# Patient Record
Sex: MALE | Race: WHITE | URBAN - METROPOLITAN AREA
[De-identification: names, ages, dates, MRNs, and addresses within clinical notes are randomized per-mention and may not be internally consistent; named-entity substitution may affect disease eponyms.]

---

## 2018-06-14 ENCOUNTER — EMERGENCY (EMERGENCY)
Facility: HOSPITAL | Age: 78
LOS: 0 days | Discharge: NURSING, REHAB OR LONG-TERM CARE | End: 2018-06-14
Attending: EMERGENCY MEDICINE | Admitting: EMERGENCY MEDICINE
Payer: MEDICARE

## 2018-06-14 VITALS
TEMPERATURE: 98 F | HEART RATE: 88 BPM | RESPIRATION RATE: 16 BRPM | SYSTOLIC BLOOD PRESSURE: 148 MMHG | OXYGEN SATURATION: 96 % | WEIGHT: 153 LBS | DIASTOLIC BLOOD PRESSURE: 96 MMHG

## 2018-06-14 DIAGNOSIS — S01.01XA LACERATION WITHOUT FOREIGN BODY OF SCALP, INITIAL ENCOUNTER: ICD-10-CM

## 2018-06-14 DIAGNOSIS — F03.90 UNSPECIFIED DEMENTIA WITHOUT BEHAVIORAL DISTURBANCE: ICD-10-CM

## 2018-06-14 DIAGNOSIS — M50.30 OTHER CERVICAL DISC DEGENERATION, UNSPECIFIED CERVICAL REGION: ICD-10-CM

## 2018-06-14 DIAGNOSIS — I10 ESSENTIAL (PRIMARY) HYPERTENSION: ICD-10-CM
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
## 2018-06-14 DIAGNOSIS — Z23 ENCOUNTER FOR IMMUNIZATION: ICD-10-CM

## 2018-06-14 DIAGNOSIS — M47.9 SPONDYLOSIS, UNSPECIFIED: ICD-10-CM
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
## 2018-06-14 DIAGNOSIS — E78.5 HYPERLIPIDEMIA, UNSPECIFIED: ICD-10-CM

## 2018-06-14 DIAGNOSIS — I67.89 OTHER CEREBROVASCULAR DISEASE: ICD-10-CM

## 2018-06-14 LAB
ALBUMIN SERPL ELPH-MCNC: 3.6 G/DL — SIGNIFICANT CHANGE UP (ref 3.3–5)
ALP SERPL-CCNC: 50 U/L — SIGNIFICANT CHANGE UP (ref 40–120)
ALT FLD-CCNC: 21 U/L — SIGNIFICANT CHANGE UP (ref 12–78)
ANION GAP SERPL CALC-SCNC: 5 MMOL/L — SIGNIFICANT CHANGE UP (ref 5–17)
APPEARANCE UR: CLEAR — SIGNIFICANT CHANGE UP
AST SERPL-CCNC: 19 U/L — SIGNIFICANT CHANGE UP (ref 15–37)
BASOPHILS # BLD AUTO: 0.07 K/UL — SIGNIFICANT CHANGE UP (ref 0–0.2)
BASOPHILS NFR BLD AUTO: 0.8 % — SIGNIFICANT CHANGE UP (ref 0–2)
BILIRUB SERPL-MCNC: 0.4 MG/DL — SIGNIFICANT CHANGE UP (ref 0.2–1.2)
BILIRUB UR-MCNC: NEGATIVE — SIGNIFICANT CHANGE UP
BUN SERPL-MCNC: 20 MG/DL — SIGNIFICANT CHANGE UP (ref 7–23)
CALCIUM SERPL-MCNC: 9.1 MG/DL — SIGNIFICANT CHANGE UP (ref 8.5–10.1)
CHLORIDE SERPL-SCNC: 107 MMOL/L — SIGNIFICANT CHANGE UP (ref 96–108)
CO2 SERPL-SCNC: 30 MMOL/L — SIGNIFICANT CHANGE UP (ref 22–31)
COLOR SPEC: YELLOW — SIGNIFICANT CHANGE UP
CREAT SERPL-MCNC: 0.93 MG/DL — SIGNIFICANT CHANGE UP (ref 0.5–1.3)
DIFF PNL FLD: NEGATIVE — SIGNIFICANT CHANGE UP
EOSINOPHIL # BLD AUTO: 0.55 K/UL — HIGH (ref 0–0.5)
EOSINOPHIL NFR BLD AUTO: 6.1 % — HIGH (ref 0–6)
GLUCOSE SERPL-MCNC: 91 MG/DL — SIGNIFICANT CHANGE UP (ref 70–99)
GLUCOSE UR QL: NEGATIVE MG/DL — SIGNIFICANT CHANGE UP
HCT VFR BLD CALC: 40 % — SIGNIFICANT CHANGE UP (ref 39–50)
HGB BLD-MCNC: 13.1 G/DL — SIGNIFICANT CHANGE UP (ref 13–17)
IMM GRANULOCYTES NFR BLD AUTO: 0.3 % — SIGNIFICANT CHANGE UP (ref 0–1.5)
KETONES UR-MCNC: NEGATIVE — SIGNIFICANT CHANGE UP
LEUKOCYTE ESTERASE UR-ACNC: NEGATIVE — SIGNIFICANT CHANGE UP
LYMPHOCYTES # BLD AUTO: 1.55 K/UL — SIGNIFICANT CHANGE UP (ref 1–3.3)
LYMPHOCYTES # BLD AUTO: 17.3 % — SIGNIFICANT CHANGE UP (ref 13–44)
MCHC RBC-ENTMCNC: 31.3 PG — SIGNIFICANT CHANGE UP (ref 27–34)
MCHC RBC-ENTMCNC: 32.8 GM/DL — SIGNIFICANT CHANGE UP (ref 32–36)
MCV RBC AUTO: 95.7 FL — SIGNIFICANT CHANGE UP (ref 80–100)
MONOCYTES # BLD AUTO: 1.02 K/UL — HIGH (ref 0–0.9)
MONOCYTES NFR BLD AUTO: 11.4 % — SIGNIFICANT CHANGE UP (ref 2–14)
NEUTROPHILS # BLD AUTO: 5.73 K/UL — SIGNIFICANT CHANGE UP (ref 1.8–7.4)
NEUTROPHILS NFR BLD AUTO: 64.1 % — SIGNIFICANT CHANGE UP (ref 43–77)
NITRITE UR-MCNC: NEGATIVE — SIGNIFICANT CHANGE UP
PH UR: 5 — SIGNIFICANT CHANGE UP (ref 5–8)
PLATELET # BLD AUTO: 214 K/UL — SIGNIFICANT CHANGE UP (ref 150–400)
POTASSIUM SERPL-MCNC: 4.3 MMOL/L — SIGNIFICANT CHANGE UP (ref 3.5–5.3)
POTASSIUM SERPL-SCNC: 4.3 MMOL/L — SIGNIFICANT CHANGE UP (ref 3.5–5.3)
PROT SERPL-MCNC: 7.6 GM/DL — SIGNIFICANT CHANGE UP (ref 6–8.3)
PROT UR-MCNC: NEGATIVE MG/DL — SIGNIFICANT CHANGE UP
RBC # BLD: 4.18 M/UL — LOW (ref 4.2–5.8)
RBC # FLD: 13.4 % — SIGNIFICANT CHANGE UP (ref 10.3–14.5)
SODIUM SERPL-SCNC: 142 MMOL/L — SIGNIFICANT CHANGE UP (ref 135–145)
SP GR SPEC: 1.01 — SIGNIFICANT CHANGE UP (ref 1.01–1.02)
TROPONIN I SERPL-MCNC: <0.015 NG/ML — SIGNIFICANT CHANGE UP (ref 0.01–0.04)
TROPONIN I SERPL-MCNC: <0.015 NG/ML — SIGNIFICANT CHANGE UP (ref 0.01–0.04)
UROBILINOGEN FLD QL: NEGATIVE MG/DL — SIGNIFICANT CHANGE UP
WBC # BLD: 8.95 K/UL — SIGNIFICANT CHANGE UP (ref 3.8–10.5)
WBC # FLD AUTO: 8.95 K/UL — SIGNIFICANT CHANGE UP (ref 3.8–10.5)

## 2018-06-14 PROCEDURE — 12001 RPR S/N/AX/GEN/TRNK 2.5CM/<: CPT

## 2018-06-14 PROCEDURE — 93010 ELECTROCARDIOGRAM REPORT: CPT

## 2018-06-14 PROCEDURE — 72190 X-RAY EXAM OF PELVIS: CPT | Mod: 26

## 2018-06-14 PROCEDURE — 71045 X-RAY EXAM CHEST 1 VIEW: CPT | Mod: 26

## 2018-06-14 PROCEDURE — 72125 CT NECK SPINE W/O DYE: CPT | Mod: 26

## 2018-06-14 PROCEDURE — 70450 CT HEAD/BRAIN W/O DYE: CPT | Mod: 26

## 2018-06-14 PROCEDURE — 99285 EMERGENCY DEPT VISIT HI MDM: CPT

## 2018-06-14 RX ORDER — TETANUS TOXOID, REDUCED DIPHTHERIA TOXOID AND ACELLULAR PERTUSSIS VACCINE, ADSORBED 5; 2.5; 8; 8; 2.5 [IU]/.5ML; [IU]/.5ML; UG/.5ML; UG/.5ML; UG/.5ML
0.5 SUSPENSION INTRAMUSCULAR ONCE
Qty: 0 | Refills: 0 | Status: COMPLETED | OUTPATIENT
Start: 2018-06-14 | End: 2018-06-14

## 2018-06-14 RX ADMIN — TETANUS TOXOID, REDUCED DIPHTHERIA TOXOID AND ACELLULAR PERTUSSIS VACCINE, ADSORBED 0.5 MILLILITER(S): 5; 2.5; 8; 8; 2.5 SUSPENSION INTRAMUSCULAR at 15:45

## 2018-06-14 NOTE — ED ADULT NURSE NOTE - OBJECTIVE STATEMENT
Pt resident of [facility]. BIBA with report of unwitnessed fall. Pt reports that he lost his balance and that this happens. Pt [place] and sometimes has difficulty communicating, but is able to answer questions appropriately and make needs known. Pt to ct scan with trauma alert protocol maintained.

## 2018-06-14 NOTE — ED PROVIDER NOTE - OBJECTIVE STATEMENT
77 y/o male with a PMHx of HTN, dyslipidemia, dementia presents to the ED s/p unwitnessed fall. +laceration. Pt states he is unsure of what happened. He suspects he lost his balance. Unsure if LOC. Denies CP, SOB, fever, chills, n/v/d. No other acute complaints at this time. Pt is a nursing home resident.

## 2018-06-14 NOTE — ED PROVIDER NOTE - MEDICAL DECISION MAKING DETAILS
79 y/o M trauma alert presents s/p fall. Unclear radiology of fall since unwitnessed. Plan for labs Cardiac Enzymes x2, EKG, imaging, reassess.

## 2018-06-14 NOTE — ED ADULT NURSE NOTE - CHIEF COMPLAINT QUOTE
Unwitnessed fall at [facility].  Occipital laceration, unknown LOC.  Cervical collar placed by EMS.  GCS 15 on arrival, pt [place].  Daily asa.  Trauma alert called at 13:21

## 2018-06-14 NOTE — ED PROVIDER NOTE - UNABLE TO OBTAIN
Unable to obtain fully reliable Hx due to dementia. Hx obtained from a combination of pt and nursing home documentation. Dementia

## 2018-06-14 NOTE — ED PROVIDER NOTE - PROGRESS NOTE DETAILS
pts laceration on scalp closed  by CAROL Peralta, chaperoned by me.  2nd troponin negative, will dc back to NH. pt agreeable with plan. ccollar cleared

## 2018-06-14 NOTE — ED PROVIDER NOTE - CONSTITUTIONAL, MLM
normal... Well appearing, well nourished, awake, alert, oriented to person, place, time/situation and in no apparent distress. GCS 15.

## 2018-06-14 NOTE — ED PROVIDER NOTE - MUSCULOSKELETAL, MLM
Spine appears normal, range of motion is not limited, no muscle or joint tenderness. C collar in place.

## 2018-06-14 NOTE — ED PROVIDER NOTE - NS_ ATTENDINGSCRIBEDETAILS _ED_A_ED_FT
Hood Evans DO (Attending): The history, relevant review of systems, past medical and surgical history, medical decision making, and physical examination was documented by the scribe in my presence and I attest to the accuracy of the documentation.

## 2018-06-15 LAB
CULTURE RESULTS: NO GROWTH — SIGNIFICANT CHANGE UP
SPECIMEN SOURCE: SIGNIFICANT CHANGE UP

## 2020-04-13 RX ORDER — AZITHROMYCIN 500 MG/1
1 TABLET, FILM COATED ORAL
Qty: 0 | Refills: 0 | DISCHARGE
Start: 2020-04-13 | End: 2020-04-19

## 2020-04-16 ENCOUNTER — INPATIENT (INPATIENT)
Facility: HOSPITAL | Age: 80
LOS: 2 days | DRG: 951 | End: 2020-04-19
Attending: FAMILY MEDICINE | Admitting: INTERNAL MEDICINE
Payer: MEDICARE

## 2020-04-16 VITALS
RESPIRATION RATE: 28 BRPM | HEIGHT: 68 IN | WEIGHT: 175.05 LBS | SYSTOLIC BLOOD PRESSURE: 132 MMHG | OXYGEN SATURATION: 80 % | HEART RATE: 85 BPM | DIASTOLIC BLOOD PRESSURE: 98 MMHG

## 2020-04-16 DIAGNOSIS — E78.5 HYPERLIPIDEMIA, UNSPECIFIED: ICD-10-CM

## 2020-04-16 DIAGNOSIS — N40.0 BENIGN PROSTATIC HYPERPLASIA WITHOUT LOWER URINARY TRACT SYMPTOMS: ICD-10-CM

## 2020-04-16 DIAGNOSIS — I11.0 HYPERTENSIVE HEART DISEASE WITH HEART FAILURE: ICD-10-CM

## 2020-04-16 DIAGNOSIS — U07.1 COVID-19: ICD-10-CM

## 2020-04-16 DIAGNOSIS — E86.0 DEHYDRATION: ICD-10-CM

## 2020-04-16 DIAGNOSIS — Z66 DO NOT RESUSCITATE: ICD-10-CM

## 2020-04-16 DIAGNOSIS — R13.10 DYSPHAGIA, UNSPECIFIED: ICD-10-CM

## 2020-04-16 DIAGNOSIS — J96.91 RESPIRATORY FAILURE, UNSPECIFIED WITH HYPOXIA: ICD-10-CM

## 2020-04-16 DIAGNOSIS — I47.1 SUPRAVENTRICULAR TACHYCARDIA: ICD-10-CM

## 2020-04-16 DIAGNOSIS — A41.50 GRAM-NEGATIVE SEPSIS, UNSPECIFIED: ICD-10-CM

## 2020-04-16 DIAGNOSIS — F03.90 UNSPECIFIED DEMENTIA WITHOUT BEHAVIORAL DISTURBANCE: ICD-10-CM

## 2020-04-16 DIAGNOSIS — F01.50 VASCULAR DEMENTIA WITHOUT BEHAVIORAL DISTURBANCE: ICD-10-CM

## 2020-04-16 DIAGNOSIS — E87.0 HYPEROSMOLALITY AND HYPERNATREMIA: ICD-10-CM

## 2020-04-16 DIAGNOSIS — J12.89 OTHER VIRAL PNEUMONIA: ICD-10-CM

## 2020-04-16 DIAGNOSIS — I82.452 ACUTE EMBOLISM AND THROMBOSIS OF LEFT PERONEAL VEIN: ICD-10-CM

## 2020-04-16 DIAGNOSIS — Z51.5 ENCOUNTER FOR PALLIATIVE CARE: ICD-10-CM

## 2020-04-16 DIAGNOSIS — N17.9 ACUTE KIDNEY FAILURE, UNSPECIFIED: ICD-10-CM

## 2020-04-16 DIAGNOSIS — I50.9 HEART FAILURE, UNSPECIFIED: ICD-10-CM

## 2020-04-16 DIAGNOSIS — B96.89 OTHER SPECIFIED BACTERIAL AGENTS AS THE CAUSE OF DISEASES CLASSIFIED ELSEWHERE: ICD-10-CM

## 2020-04-16 PROBLEM — I10 ESSENTIAL (PRIMARY) HYPERTENSION: Chronic | Status: ACTIVE | Noted: 2018-06-14

## 2020-04-16 LAB
-  CANDIDA ALBICANS: SIGNIFICANT CHANGE UP
-  CANDIDA GLABRATA: SIGNIFICANT CHANGE UP
-  CANDIDA KRUSEI: SIGNIFICANT CHANGE UP
-  CANDIDA PARAPSILOSIS: SIGNIFICANT CHANGE UP
-  CANDIDA TROPICALIS: SIGNIFICANT CHANGE UP
-  COAGULASE NEGATIVE STAPHYLOCOCCUS: SIGNIFICANT CHANGE UP
-  K. PNEUMONIAE GROUP: SIGNIFICANT CHANGE UP
-  KPC RESISTANCE GENE: SIGNIFICANT CHANGE UP
-  STREPTOCOCCUS SP. (NOT GRP A, B OR S PNEUMONIAE): SIGNIFICANT CHANGE UP
A BAUMANNII DNA SPEC QL NAA+PROBE: SIGNIFICANT CHANGE UP
ADD ON TEST-SPECIMEN IN LAB: SIGNIFICANT CHANGE UP
ALBUMIN SERPL ELPH-MCNC: 2.4 G/DL — LOW (ref 3.3–5)
ALP SERPL-CCNC: 173 U/L — HIGH (ref 40–120)
ALT FLD-CCNC: 26 U/L — SIGNIFICANT CHANGE UP (ref 12–78)
ANION GAP SERPL CALC-SCNC: 9 MMOL/L — SIGNIFICANT CHANGE UP (ref 5–17)
AST SERPL-CCNC: 22 U/L — SIGNIFICANT CHANGE UP (ref 15–37)
BASOPHILS # BLD AUTO: 0 K/UL — SIGNIFICANT CHANGE UP (ref 0–0.2)
BASOPHILS NFR BLD AUTO: 0 % — SIGNIFICANT CHANGE UP (ref 0–2)
BILIRUB SERPL-MCNC: 0.8 MG/DL — SIGNIFICANT CHANGE UP (ref 0.2–1.2)
BUN SERPL-MCNC: 44 MG/DL — HIGH (ref 7–23)
CALCIUM SERPL-MCNC: 8.9 MG/DL — SIGNIFICANT CHANGE UP (ref 8.5–10.1)
CHLORIDE SERPL-SCNC: 115 MMOL/L — HIGH (ref 96–108)
CK SERPL-CCNC: 32 U/L — SIGNIFICANT CHANGE UP (ref 26–308)
CO2 SERPL-SCNC: 20 MMOL/L — LOW (ref 22–31)
CREAT SERPL-MCNC: 1.67 MG/DL — HIGH (ref 0.5–1.3)
E CLOAC COMP DNA BLD POS QL NAA+PROBE: SIGNIFICANT CHANGE UP
E COLI DNA BLD POS QL NAA+NON-PROBE: SIGNIFICANT CHANGE UP
ENTEROCOC DNA BLD POS QL NAA+NON-PROBE: SIGNIFICANT CHANGE UP
ENTEROCOC DNA BLD POS QL NAA+NON-PROBE: SIGNIFICANT CHANGE UP
EOSINOPHIL # BLD AUTO: 0 K/UL — SIGNIFICANT CHANGE UP (ref 0–0.5)
EOSINOPHIL NFR BLD AUTO: 0 % — SIGNIFICANT CHANGE UP (ref 0–6)
GLUCOSE SERPL-MCNC: 151 MG/DL — HIGH (ref 70–99)
GP B STREP DNA BLD POS QL NAA+NON-PROBE: SIGNIFICANT CHANGE UP
GRAM STN FLD: SIGNIFICANT CHANGE UP
GRAM STN FLD: SIGNIFICANT CHANGE UP
HAEM INFLU DNA BLD POS QL NAA+NON-PROBE: SIGNIFICANT CHANGE UP
HCT VFR BLD CALC: 42.8 % — SIGNIFICANT CHANGE UP (ref 39–50)
HGB BLD-MCNC: 13.7 G/DL — SIGNIFICANT CHANGE UP (ref 13–17)
K OXYTOCA DNA BLD POS QL NAA+NON-PROBE: SIGNIFICANT CHANGE UP
L MONOCYTOG DNA BLD POS QL NAA+NON-PROBE: SIGNIFICANT CHANGE UP
LYMPHOCYTES # BLD AUTO: 0.45 K/UL — LOW (ref 1–3.3)
LYMPHOCYTES # BLD AUTO: 9 % — LOW (ref 13–44)
MCHC RBC-ENTMCNC: 31.4 PG — SIGNIFICANT CHANGE UP (ref 27–34)
MCHC RBC-ENTMCNC: 32 GM/DL — SIGNIFICANT CHANGE UP (ref 32–36)
MCV RBC AUTO: 98.2 FL — SIGNIFICANT CHANGE UP (ref 80–100)
METHOD TYPE: SIGNIFICANT CHANGE UP
MONOCYTES # BLD AUTO: 0.1 K/UL — SIGNIFICANT CHANGE UP (ref 0–0.9)
MONOCYTES NFR BLD AUTO: 2 % — SIGNIFICANT CHANGE UP (ref 2–14)
MRSA SPEC QL CULT: SIGNIFICANT CHANGE UP
MSSA DNA SPEC QL NAA+PROBE: SIGNIFICANT CHANGE UP
N MEN ISLT CULT: SIGNIFICANT CHANGE UP
NEUTROPHILS # BLD AUTO: 4.49 K/UL — SIGNIFICANT CHANGE UP (ref 1.8–7.4)
NEUTROPHILS NFR BLD AUTO: 89 % — HIGH (ref 43–77)
NRBC # BLD: SIGNIFICANT CHANGE UP /100 WBCS (ref 0–0)
NT-PROBNP SERPL-SCNC: 2079 PG/ML — HIGH (ref 0–450)
P AERUGINOSA DNA BLD POS NAA+NON-PROBE: SIGNIFICANT CHANGE UP
PLATELET # BLD AUTO: 67 K/UL — LOW (ref 150–400)
POTASSIUM SERPL-MCNC: 4.3 MMOL/L — SIGNIFICANT CHANGE UP (ref 3.5–5.3)
POTASSIUM SERPL-SCNC: 4.3 MMOL/L — SIGNIFICANT CHANGE UP (ref 3.5–5.3)
PROT SERPL-MCNC: 6.9 GM/DL — SIGNIFICANT CHANGE UP (ref 6–8.3)
RBC # BLD: 4.36 M/UL — SIGNIFICANT CHANGE UP (ref 4.2–5.8)
RBC # FLD: 14.6 % — HIGH (ref 10.3–14.5)
S MARCESCENS DNA BLD POS NAA+NON-PROBE: SIGNIFICANT CHANGE UP
S PNEUM DNA BLD POS QL NAA+NON-PROBE: SIGNIFICANT CHANGE UP
S PYO DNA BLD POS QL NAA+NON-PROBE: SIGNIFICANT CHANGE UP
SODIUM SERPL-SCNC: 144 MMOL/L — SIGNIFICANT CHANGE UP (ref 135–145)
SPECIMEN SOURCE: SIGNIFICANT CHANGE UP
TROPONIN I SERPL-MCNC: 0.02 NG/ML — SIGNIFICANT CHANGE UP (ref 0.01–0.04)
WBC # BLD: 5.04 K/UL — SIGNIFICANT CHANGE UP (ref 3.8–10.5)
WBC # FLD AUTO: 5.04 K/UL — SIGNIFICANT CHANGE UP (ref 3.8–10.5)

## 2020-04-16 PROCEDURE — 81001 URINALYSIS AUTO W/SCOPE: CPT

## 2020-04-16 PROCEDURE — 71045 X-RAY EXAM CHEST 1 VIEW: CPT | Mod: 26

## 2020-04-16 PROCEDURE — 85730 THROMBOPLASTIN TIME PARTIAL: CPT

## 2020-04-16 PROCEDURE — 36415 COLL VENOUS BLD VENIPUNCTURE: CPT

## 2020-04-16 PROCEDURE — 93970 EXTREMITY STUDY: CPT

## 2020-04-16 PROCEDURE — 92507 TX SP LANG VOICE COMM INDIV: CPT | Mod: GN

## 2020-04-16 PROCEDURE — 76700 US EXAM ABDOM COMPLETE: CPT

## 2020-04-16 PROCEDURE — 85025 COMPLETE CBC W/AUTO DIFF WBC: CPT

## 2020-04-16 PROCEDURE — 87040 BLOOD CULTURE FOR BACTERIA: CPT

## 2020-04-16 PROCEDURE — 80053 COMPREHEN METABOLIC PANEL: CPT

## 2020-04-16 PROCEDURE — 82728 ASSAY OF FERRITIN: CPT

## 2020-04-16 PROCEDURE — 93010 ELECTROCARDIOGRAM REPORT: CPT

## 2020-04-16 PROCEDURE — 92523 SPEECH SOUND LANG COMPREHEN: CPT | Mod: GN

## 2020-04-16 PROCEDURE — 85379 FIBRIN DEGRADATION QUANT: CPT

## 2020-04-16 PROCEDURE — 99223 1ST HOSP IP/OBS HIGH 75: CPT | Mod: CS

## 2020-04-16 PROCEDURE — 92526 ORAL FUNCTION THERAPY: CPT | Mod: GN

## 2020-04-16 PROCEDURE — 92610 EVALUATE SWALLOWING FUNCTION: CPT | Mod: GN

## 2020-04-16 PROCEDURE — 86140 C-REACTIVE PROTEIN: CPT

## 2020-04-16 PROCEDURE — 85027 COMPLETE CBC AUTOMATED: CPT

## 2020-04-16 PROCEDURE — 80048 BASIC METABOLIC PNL TOTAL CA: CPT

## 2020-04-16 RX ORDER — MORPHINE SULFATE 50 MG/1
4 CAPSULE, EXTENDED RELEASE ORAL ONCE
Refills: 0 | Status: DISCONTINUED | OUTPATIENT
Start: 2020-04-16 | End: 2020-04-16

## 2020-04-16 RX ORDER — ACETAMINOPHEN 500 MG
650 TABLET ORAL EVERY 6 HOURS
Refills: 0 | Status: DISCONTINUED | OUTPATIENT
Start: 2020-04-16 | End: 2020-04-16

## 2020-04-16 RX ORDER — ALBUTEROL 90 UG/1
2 AEROSOL, METERED ORAL EVERY 4 HOURS
Refills: 0 | Status: DISCONTINUED | OUTPATIENT
Start: 2020-04-16 | End: 2020-04-19

## 2020-04-16 RX ORDER — MORPHINE SULFATE 50 MG/1
4 CAPSULE, EXTENDED RELEASE ORAL EVERY 4 HOURS
Refills: 0 | Status: DISCONTINUED | OUTPATIENT
Start: 2020-04-16 | End: 2020-04-16

## 2020-04-16 RX ORDER — DILTIAZEM HCL 120 MG
20 CAPSULE, EXT RELEASE 24 HR ORAL ONCE
Refills: 0 | Status: COMPLETED | OUTPATIENT
Start: 2020-04-16 | End: 2020-04-16

## 2020-04-16 RX ORDER — ACETAMINOPHEN 500 MG
650 TABLET ORAL EVERY 6 HOURS
Refills: 0 | Status: DISCONTINUED | OUTPATIENT
Start: 2020-04-16 | End: 2020-04-19

## 2020-04-16 RX ORDER — PIPERACILLIN AND TAZOBACTAM 4; .5 G/20ML; G/20ML
3.38 INJECTION, POWDER, LYOPHILIZED, FOR SOLUTION INTRAVENOUS ONCE
Refills: 0 | Status: COMPLETED | OUTPATIENT
Start: 2020-04-16 | End: 2020-04-16

## 2020-04-16 RX ORDER — SODIUM CHLORIDE 9 MG/ML
500 INJECTION INTRAMUSCULAR; INTRAVENOUS; SUBCUTANEOUS ONCE
Refills: 0 | Status: COMPLETED | OUTPATIENT
Start: 2020-04-16 | End: 2020-04-16

## 2020-04-16 RX ORDER — ACETAMINOPHEN 500 MG
650 TABLET ORAL ONCE
Refills: 0 | Status: COMPLETED | OUTPATIENT
Start: 2020-04-16 | End: 2020-04-16

## 2020-04-16 RX ORDER — SODIUM CHLORIDE 9 MG/ML
1000 INJECTION INTRAMUSCULAR; INTRAVENOUS; SUBCUTANEOUS
Refills: 0 | Status: DISCONTINUED | OUTPATIENT
Start: 2020-04-16 | End: 2020-04-18

## 2020-04-16 RX ORDER — PIPERACILLIN AND TAZOBACTAM 4; .5 G/20ML; G/20ML
3.38 INJECTION, POWDER, LYOPHILIZED, FOR SOLUTION INTRAVENOUS EVERY 8 HOURS
Refills: 0 | Status: DISCONTINUED | OUTPATIENT
Start: 2020-04-16 | End: 2020-04-18

## 2020-04-16 RX ADMIN — Medication 1 MILLIGRAM(S): at 02:30

## 2020-04-16 RX ADMIN — PIPERACILLIN AND TAZOBACTAM 200 GRAM(S): 4; .5 INJECTION, POWDER, LYOPHILIZED, FOR SOLUTION INTRAVENOUS at 23:00

## 2020-04-16 RX ADMIN — Medication 650 MILLIGRAM(S): at 10:34

## 2020-04-16 RX ADMIN — Medication 20 MILLIGRAM(S): at 02:45

## 2020-04-16 RX ADMIN — Medication 1 MILLIGRAM(S): at 03:26

## 2020-04-16 RX ADMIN — SODIUM CHLORIDE 500 MILLILITER(S): 9 INJECTION INTRAMUSCULAR; INTRAVENOUS; SUBCUTANEOUS at 10:33

## 2020-04-16 RX ADMIN — MORPHINE SULFATE 4 MILLIGRAM(S): 50 CAPSULE, EXTENDED RELEASE ORAL at 02:33

## 2020-04-16 RX ADMIN — Medication 1 MILLIGRAM(S): at 06:20

## 2020-04-16 RX ADMIN — SODIUM CHLORIDE 75 MILLILITER(S): 9 INJECTION INTRAMUSCULAR; INTRAVENOUS; SUBCUTANEOUS at 10:33

## 2020-04-16 RX ADMIN — Medication 650 MILLIGRAM(S): at 02:40

## 2020-04-16 RX ADMIN — MORPHINE SULFATE 4 MILLIGRAM(S): 50 CAPSULE, EXTENDED RELEASE ORAL at 03:29

## 2020-04-16 RX ADMIN — ALBUTEROL 2 PUFF(S): 90 AEROSOL, METERED ORAL at 02:35

## 2020-04-16 NOTE — ED ADULT TRIAGE NOTE - CHIEF COMPLAINT QUOTE
Patient from [facility] with SOB, 80% on RA.  Patient not cooperative with keeping O2 on.  +COVID.  Full code.

## 2020-04-16 NOTE — ED ADULT NURSE NOTE - CHIEF COMPLAINT QUOTE
Patient from [place] nursing [facility] with SOB, 80% on RA.  Patient not cooperative with keeping O2 on.  +COVID.  Full code.

## 2020-04-16 NOTE — ED ADULT NURSE REASSESSMENT NOTE - NS ED NURSE REASSESS COMMENT FT1
Patient resting comfortably in stretcher, agonal breathing, no acute distress noted.  Patient turned and repositioned for comfort, perineal care provided.

## 2020-04-16 NOTE — ED ADULT NURSE REASSESSMENT NOTE - NS ED NURSE REASSESS COMMENT FT1
Patient resting comfortably in stretcher, no distress noted at this time.  MD Simpson spoke with sister in law, power of , patient DNR/DNI.

## 2020-04-16 NOTE — ED PROVIDER NOTE - PROGRESS NOTE DETAILS
speaking with ERICA delgado  [phone] sister in law pt to be dnr dni after disuccsuing presentation spoke with POA sandy live in [place] this is pts only family and she is POA. she completely agrees to DNR/DNI. advised I would like to make him comfortable ativan morphine and she completely agrees . also found to be in svt cardizem given hr improved per sandy no aggressive interventions to keep pt comfortable . no intubation no cpr  no pressors per POA . aware of hypotension after iv AV edda blocker  will not supprt wuith pressors based of POA wish . cointinuing to follow closely and make comfrotable risk of death very high , informed POA of this as well updated sandy on current critical nature of pt agrees to no pressor no more iv antiarrythmics pt to be dnr dni palliative care. agrees to prn ativan morphine for pain /agitation. will treat pain agiation in ed monitor closely possible admission for pallaitive hospice care

## 2020-04-16 NOTE — ED PROVIDER NOTE - CARE PLAN
Principal Discharge DX:	Pneumonia due to 2019 novel coronavirus  Secondary Diagnosis:	SVT (supraventricular tachycardia)  Secondary Diagnosis:	Respiratory failure with hypoxia

## 2020-04-16 NOTE — H&P ADULT - ASSESSMENT
80 year old male with PMHx of vascular demenita, HTN, CHF, BPH, pt presents to ED from [facility] in respiratory distress room air sat 80 percent and agitation.  Per  [facility] records known covid + on azithromycin and hydroxychloroquine per [facility] outpt meds. In ED patient found to be in SVT, s/p cardizem 20mg IVP x 1 and heart improved. Patient given morphine 4mg IVP x 2 and Ativan 1mg IVP x 2 in ED.  GOC and advanced directives discussion taken place by ED Provider and ERICA Mcneill (Sister in law- lives in [place] [phone] ), DNR/DNI in place with focus of keeping patient comfortable. Hospice referral made by ED.

## 2020-04-16 NOTE — H&P ADULT - HISTORY OF PRESENT ILLNESS
pt presents to ED from [facility] in respiratory distress room air sat 80 percent. per records pt with h/o chf vascular dementia htn. known covid + on azithromycin and hydroxychloroquinone outpt meds. pt in severe resp distress and unable to give any reliable hisotry 80 year old male with PMHx of vascular demenita, HTN, CHF, BPH, pt presents to ED from [facility] in respiratory distress room air sat 80 percent and agitation.  Per  [facility] records known covid + on azithromycin and hydroxychloroquine per [facility] outpt meds. In ED patient found to be in SVT, s/p cardizem 20mg IVP x 1 and heart improved. Patient given morphine 4mg IVP x 2 and Ativan 1mg IVP x 2 in ED.  GOC and advanced directives discussion taken place by ED Provider and ERICA Mcneill (Sister in law- lives in [place] [phone] ), DNR/DNI in place with focus of keeping patient comfortable. Hospice referral made by ED.

## 2020-04-16 NOTE — ED ADULT NURSE NOTE - NSIMPLEMENTINTERV_GEN_ALL_ED
Implemented All Fall with Harm Risk Interventions:  [place] to call system. Call bell, personal items and telephone within reach. Instruct patient to call for assistance. Room bathroom lighting operational. Non-slip footwear when patient is off stretcher. Physically safe environment: no spills, clutter or unnecessary equipment. Stretcher in lowest position, wheels locked, appropriate side rails in place. Provide visual cue, wrist band, yellow gown, etc. Monitor gait and stability. Monitor for mental status changes and reorient to person, place, and time. Review medications for side effects contributing to fall risk. Reinforce activity limits and safety measures with patient and family. Provide visual clues: red socks.

## 2020-04-16 NOTE — H&P ADULT - PROBLEM SELECTOR PLAN 2
resolved  s/p Cardizem 20mg IVP x1 in ED  remained hypotensive   500cc IV bolus x 1  then NS @ 75mL x 12 hours

## 2020-04-16 NOTE — ED ADULT NURSE REASSESSMENT NOTE - NS ED NURSE REASSESS COMMENT FT1
Patient resting comfortably in stretcher, no acute distress noted.  Patient positioned for comfort, provided blankets and pillows.  Comfort measures provided.

## 2020-04-16 NOTE — H&P ADULT - PROBLEM SELECTOR PLAN 3
patient given medications for symptoms of agitation  will hold oral meds   symptomatic medications via IV route can be given as needed for symptoms

## 2020-04-16 NOTE — H&P ADULT - NSHPPHYSICALEXAM_GEN_ALL_CORE
Vital Signs Last 24 Hrs  T(C): 38.9 (16 Apr 2020 02:20), Max: 38.9 (16 Apr 2020 02:20)  T(F): 102 (16 Apr 2020 02:20), Max: 102 (16 Apr 2020 02:20)  HR: 103 (16 Apr 2020 06:24) (85 - 212)  BP: 71/48 (16 Apr 2020 02:39) (71/48 - 133/114)  BP(mean): --  RR: 8 (16 Apr 2020 06:24) (8 - 42)  SpO2: 96% (16 Apr 2020 06:24) (80% - 99%)    HEENT:   pupils equal and reactive, EOMI, no oropharyngeal lesions, erythema, exudates, oral thrush    NECK:   supple, no carotid bruits, no palpable lymph nodes, no thyromegaly    CV:  +S1, +S2, regular, no murmurs or rubs    RESP:   lungs clear to auscultation bilaterally, no wheezing, rales, rhonchi, good air entry bilaterally    BREAST:  not examined    GI:  abdomen soft, non-tender, non-distended, normal BS, no bruits, no abdominal masses, no palpable masses    RECTAL:  not examined    :  not examined    MSK:   normal muscle tone, no atrophy, no rigidity, no contractions    EXT:   no clubbing, no cyanosis, no edema, no calf pain, swelling or erythema    VASCULAR:  pulses equal and symmetric in the upper and lower extremities    NEURO:  AAOX3, no focal neurological deficits, follows all commands, able to move extremities spontaneously    SKIN:  no ulcers, lesions or rashes Vital Signs Last 24 Hrs  T(C): 38.9 (16 Apr 2020 02:20), Max: 38.9 (16 Apr 2020 02:20)  T(F): 102 (16 Apr 2020 02:20), Max: 102 (16 Apr 2020 02:20)  HR: 94 (16 Apr 2020 08:30) (85 - 212)  BP: 80/55 (16 Apr 2020 08:30) (71/48 - 133/114)  RR: 10 (16 Apr 2020 08:30) (8 - 42)  SpO2: 97% (16 Apr 2020 08:30) (80% - 99%)    HEENT:   pupils equal and reactive, EOMI, no oropharyngeal lesions, erythema, exudates, oral thrush    NECK:   supple, no carotid bruits, no palpable lymph nodes, no thyromegaly    CV:  +S1, +S2, regular, no murmurs or rubs    RESP:   lungs clear to auscultation bilaterally, no wheezing, rales, rhonchi, good air entry bilaterally    BREAST:  not examined    GI:  abdomen soft, non-tender, non-distended, normal BS, no bruits, no abdominal masses, no palpable masses    RECTAL:  not examined    :  not examined    MSK:   normal muscle tone, no atrophy, no rigidity, no contractions    EXT:   no clubbing, no cyanosis, no edema, no calf pain, swelling or erythema    VASCULAR:  pulses equal and symmetric in the upper and lower extremities    NEURO:  AAOX3, no focal neurological deficits, follows all commands, able to move extremities spontaneously    SKIN:  no ulcers, lesions or rashes

## 2020-04-16 NOTE — ED ADULT NURSE NOTE - OBJECTIVE STATEMENT
Patient baseline dementia, BIBEMS from [place] complaining of shortness of breath.  Patients oxygen saturation 85% on RA.  Patient pulling at nonrebreather face mask.  Patient has hx of CHF and CP. Unable to obtain hx due to baseline dementia.  Patient +covid-19 as of 4/9.

## 2020-04-16 NOTE — H&P ADULT - NSHPLABSRESULTS_GEN_ALL_CORE
16 Apr 2020 02:18    144    |  115    |  44     ----------------------------<  151    4.3     |  20     |  1.67     Ca    8.9        16 Apr 2020 02:18    TPro  6.9    /  Alb  2.4    /  TBili  0.8    /  DBili  x      /  AST  22     /  ALT  26     /  AlkPhos  173    16 Apr 2020 02:18  LIVER FUNCTIONS - ( 16 Apr 2020 02:18 )  Alb: 2.4 g/dL / Pro: 6.9 gm/dL / ALK PHOS: 173 U/L / ALT: 26 U/L / AST: 22 U/L / GGT: x         CBC Full  -  ( 16 Apr 2020 02:18 )  WBC Count : 5.04 K/uL  Hemoglobin : 13.7 g/dL  Hematocrit : 42.8 %  Platelet Count - Automated : 67 K/uL  Mean Cell Volume : 98.2 fl  Mean Cell Hemoglobin : 31.4 pg  Mean Cell Hemoglobin Concentration : 32.0 gm/dL  Auto Neutrophil # : 4.49 K/uL  Auto Lymphocyte # : 0.45 K/uL  Auto Monocyte # : 0.10 K/uL  Auto Eosinophil # : 0.00 K/uL  Auto Basophil # : 0.00 K/uL  Auto Neutrophil % : 89.0 %  Auto Lymphocyte % : 9.0 %  Auto Monocyte % : 2.0 %  Auto Eosinophil % : 0.0 %  Auto Basophil % : 0.0 %  CARDIAC MARKERS ( 16 Apr 2020 02:18 )  0.021 ng/mL / x     / 32 U/L / x     / x 16 Apr 2020 02:18             04-16    144  |  115<H>  |  44<H>  ----------------------------<  151<H>  4.3   |  20<L>  |  1.67<H>    Ca    8.9      16 Apr 2020 02:18    TPro  6.9  /  Alb  2.4<L>  /  TBili  0.8  /  DBili  x   /  AST  22  /  ALT  26  /  AlkPhos  173<H>  04-16      144    |  115    |  44     ----------------------------<  151    4.3     |  20     |  1.67     Ca    8.9        16 Apr 2020 02:18    TPro  6.9    /  Alb  2.4    /  TBili  0.8    /  DBili  x      /  AST  22     /  ALT  26     /  AlkPhos  173    16 Apr 2020 02:18  LIVER FUNCTIONS - ( 16 Apr 2020 02:18 )  Alb: 2.4 g/dL / Pro: 6.9 gm/dL / ALK PHOS: 173 U/L / ALT: 26 U/L / AST: 22 U/L / GGT: x                      13.7   5.04  )-----------( 67       ( 16 Apr 2020 02:18 )             42.8     CBC Full  -  ( 16 Apr 2020 02:18 )  WBC Count : 5.04 K/uL  Hemoglobin : 13.7 g/dL  Hematocrit : 42.8 %  Platelet Count - Automated : 67 K/uL  Mean Cell Volume : 98.2 fl  Mean Cell Hemoglobin : 31.4 pg  Mean Cell Hemoglobin Concentration : 32.0 gm/dL  Auto Neutrophil # : 4.49 K/uL  Auto Lymphocyte # : 0.45 K/uL  Auto Monocyte # : 0.10 K/uL  Auto Eosinophil # : 0.00 K/uL  Auto Basophil # : 0.00 K/uL  Auto Neutrophil % : 89.0 %  Auto Lymphocyte % : 9.0 %  Auto Monocyte % : 2.0 %  Auto Eosinophil % : 0.0 %  Auto Basophil % : 0.0 %  CARDIAC MARKERS ( 16 Apr 2020 02:18 )  0.021 ng/mL / x     / 32 U/L / x     / x

## 2020-04-16 NOTE — ED PROVIDER NOTE - OBJECTIVE STATEMENT
pt presents to ED from [facility] in respiratory distress room air sat 80 percent. per records pt with h/o chf vascular dementia htn. known covid + on azithromycin and hydroxychloroquinone outpt meds. pt in severe resp distress and unable to give any reliable hisotry

## 2020-04-16 NOTE — H&P ADULT - PROBLEM SELECTOR PLAN 1
2/2 COVID-19/ viral PNA  - Maintain on airborne isolation.  - Continue with O2 as needed via nasal cannula and up-titrate as needed. Currently on 5L saturating 95-97%  -Acetaminophen 650 mg PA q4h PRN fever. Limit use of NSAIDs.  - HFA albuterol Q6 hour PRN via MDI. Would avoid nebulized preparations to limit risk of aerosol formation.  -inflammatory markers pending   - Goals of Care discussion had with ERICA Mcneill, by ED Provider: DNR/DNI- hospice referral pending, KENDALL in chart

## 2020-04-17 LAB
ANION GAP SERPL CALC-SCNC: 3 MMOL/L — LOW (ref 5–17)
APPEARANCE UR: CLEAR — SIGNIFICANT CHANGE UP
BASOPHILS # BLD AUTO: 0.06 K/UL — SIGNIFICANT CHANGE UP (ref 0–0.2)
BASOPHILS NFR BLD AUTO: 0.3 % — SIGNIFICANT CHANGE UP (ref 0–2)
BILIRUB UR-MCNC: NEGATIVE — SIGNIFICANT CHANGE UP
BUN SERPL-MCNC: 47 MG/DL — HIGH (ref 7–23)
CALCIUM SERPL-MCNC: 8.4 MG/DL — LOW (ref 8.5–10.1)
CHLORIDE SERPL-SCNC: 120 MMOL/L — HIGH (ref 96–108)
CO2 SERPL-SCNC: 28 MMOL/L — SIGNIFICANT CHANGE UP (ref 22–31)
COLOR SPEC: YELLOW — SIGNIFICANT CHANGE UP
CREAT SERPL-MCNC: 1.33 MG/DL — HIGH (ref 0.5–1.3)
DIFF PNL FLD: ABNORMAL
EOSINOPHIL # BLD AUTO: 0 K/UL — SIGNIFICANT CHANGE UP (ref 0–0.5)
EOSINOPHIL NFR BLD AUTO: 0 % — SIGNIFICANT CHANGE UP (ref 0–6)
GLUCOSE SERPL-MCNC: 100 MG/DL — HIGH (ref 70–99)
GLUCOSE UR QL: NEGATIVE MG/DL — SIGNIFICANT CHANGE UP
HCT VFR BLD CALC: 41.9 % — SIGNIFICANT CHANGE UP (ref 39–50)
HGB BLD-MCNC: 13.2 G/DL — SIGNIFICANT CHANGE UP (ref 13–17)
IMM GRANULOCYTES NFR BLD AUTO: 1.1 % — SIGNIFICANT CHANGE UP (ref 0–1.5)
KETONES UR-MCNC: NEGATIVE — SIGNIFICANT CHANGE UP
LEUKOCYTE ESTERASE UR-ACNC: ABNORMAL
LYMPHOCYTES # BLD AUTO: 0.86 K/UL — LOW (ref 1–3.3)
LYMPHOCYTES # BLD AUTO: 4 % — LOW (ref 13–44)
MCHC RBC-ENTMCNC: 31.4 PG — SIGNIFICANT CHANGE UP (ref 27–34)
MCHC RBC-ENTMCNC: 31.5 GM/DL — LOW (ref 32–36)
MCV RBC AUTO: 99.8 FL — SIGNIFICANT CHANGE UP (ref 80–100)
MONOCYTES # BLD AUTO: 1.88 K/UL — HIGH (ref 0–0.9)
MONOCYTES NFR BLD AUTO: 8.9 % — SIGNIFICANT CHANGE UP (ref 2–14)
NEUTROPHILS # BLD AUTO: 18.21 K/UL — HIGH (ref 1.8–7.4)
NEUTROPHILS NFR BLD AUTO: 85.7 % — HIGH (ref 43–77)
NITRITE UR-MCNC: NEGATIVE — SIGNIFICANT CHANGE UP
PH UR: 5 — SIGNIFICANT CHANGE UP (ref 5–8)
PLATELET # BLD AUTO: 63 K/UL — LOW (ref 150–400)
POTASSIUM SERPL-MCNC: 3.8 MMOL/L — SIGNIFICANT CHANGE UP (ref 3.5–5.3)
POTASSIUM SERPL-SCNC: 3.8 MMOL/L — SIGNIFICANT CHANGE UP (ref 3.5–5.3)
PROT UR-MCNC: 30 MG/DL
RBC # BLD: 4.2 M/UL — SIGNIFICANT CHANGE UP (ref 4.2–5.8)
RBC # FLD: 14.8 % — HIGH (ref 10.3–14.5)
SODIUM SERPL-SCNC: 151 MMOL/L — HIGH (ref 135–145)
SP GR SPEC: 1.01 — SIGNIFICANT CHANGE UP (ref 1.01–1.02)
UROBILINOGEN FLD QL: NEGATIVE MG/DL — SIGNIFICANT CHANGE UP
WBC # BLD: 21.24 K/UL — HIGH (ref 3.8–10.5)
WBC # FLD AUTO: 21.24 K/UL — HIGH (ref 3.8–10.5)

## 2020-04-17 PROCEDURE — 99232 SBSQ HOSP IP/OBS MODERATE 35: CPT | Mod: CS

## 2020-04-17 PROCEDURE — 93970 EXTREMITY STUDY: CPT | Mod: 26,CS

## 2020-04-17 PROCEDURE — 76700 US EXAM ABDOM COMPLETE: CPT | Mod: 26

## 2020-04-17 RX ORDER — ENOXAPARIN SODIUM 100 MG/ML
40 INJECTION SUBCUTANEOUS DAILY
Refills: 0 | Status: DISCONTINUED | OUTPATIENT
Start: 2020-04-17 | End: 2020-04-18

## 2020-04-17 RX ORDER — SODIUM CHLORIDE 9 MG/ML
1000 INJECTION, SOLUTION INTRAVENOUS
Refills: 0 | Status: DISCONTINUED | OUTPATIENT
Start: 2020-04-17 | End: 2020-04-18

## 2020-04-17 RX ORDER — SODIUM CHLORIDE 9 MG/ML
1000 INJECTION, SOLUTION INTRAVENOUS
Refills: 0 | Status: DISCONTINUED | OUTPATIENT
Start: 2020-04-17 | End: 2020-04-17

## 2020-04-17 RX ADMIN — SODIUM CHLORIDE 50 MILLILITER(S): 9 INJECTION, SOLUTION INTRAVENOUS at 17:39

## 2020-04-17 RX ADMIN — PIPERACILLIN AND TAZOBACTAM 25 GRAM(S): 4; .5 INJECTION, POWDER, LYOPHILIZED, FOR SOLUTION INTRAVENOUS at 06:01

## 2020-04-17 RX ADMIN — ENOXAPARIN SODIUM 40 MILLIGRAM(S): 100 INJECTION SUBCUTANEOUS at 17:39

## 2020-04-17 RX ADMIN — Medication 650 MILLIGRAM(S): at 17:36

## 2020-04-17 RX ADMIN — PIPERACILLIN AND TAZOBACTAM 25 GRAM(S): 4; .5 INJECTION, POWDER, LYOPHILIZED, FOR SOLUTION INTRAVENOUS at 15:39

## 2020-04-17 RX ADMIN — PIPERACILLIN AND TAZOBACTAM 25 GRAM(S): 4; .5 INJECTION, POWDER, LYOPHILIZED, FOR SOLUTION INTRAVENOUS at 21:41

## 2020-04-17 NOTE — PROGRESS NOTE ADULT - SUBJECTIVE AND OBJECTIVE BOX
NP Progress Note       Follow Up:  SOB     HPI:  80 year old male with PMHx of vascular demenita, HTN, CHF, BPH, pt presents to ED from [facility] in respiratory distress room air sat 80 percent and agitation.  Per  [facility] records known covid + on azithromycin and hydroxychloroquine per [facility] outpt meds. In ED patient found to be in SVT, s/p cardizem 20mg IVP x 1 and heart improved. Patient given morphine 4mg IVP x 2 and Ativan 1mg IVP x 2 in ED.  GOC and advanced directives discussion taken place by ED Provider and ERICA Charito (Sister in law- lives in [place] [phone] ), DNR/DNI in place with focus of keeping patient comfortable. Hospice referral made by ED. (16 Apr 2020 07:52)        Subjective/Observations: Pt. seen and examined and evaluated. Pt. resting comfortably in bed in NAD, with no respiratory distress, no chest pain, dyspnea, palpitations, PND, or orthopnea.      REVIEW OF SYSTEMS: All other review of systems is negative unless indicated above    PAST MEDICAL & SURGICAL HISTORY:  Dementia  Dyslipidemia  HTN (hypertension)  No significant past surgical history      MEDICATIONS  (STANDING):  piperacillin/tazobactam IVPB.. 3.375 Gram(s) IV Intermittent every 8 hours  sodium chloride 0.9%. 1000 milliLiter(s) (75 mL/Hr) IV Continuous <Continuous>    MEDICATIONS  (PRN):  acetaminophen  Suppository .. 650 milliGRAM(s) Rectal every 6 hours PRN Temp greater or equal to 38C (100.4F), Mild Pain (1 - 3)  ALBUTerol    90 MICROgram(s) HFA Inhaler 2 Puff(s) Inhalation every 4 hours PRN Shortness of Breath and/or Wheezing  LORazepam   Injectable 0.5 milliGRAM(s) IV Push every 4 hours PRN anxiety/ agitation      Allergies:    No Known Allergies    Intolerances          Vital Signs Last 24 Hrs  T(C): 36.9 (17 Apr 2020 05:56), Max: 37.2 (16 Apr 2020 19:50)  T(F): 98.5 (17 Apr 2020 05:56), Max: 98.9 (16 Apr 2020 19:50)  HR: 86 (17 Apr 2020 05:56) (86 - 86)  BP: 134/74 (17 Apr 2020 05:56) (134/74 - 134/74)  BP(mean): --  RR: 15 (17 Apr 2020 05:56) (11 - 15)  SpO2: 97% (17 Apr 2020 05:56) (90% - 97%)    I&O's Summary    16 Apr 2020 07:01  -  17 Apr 2020 07:00  --------------------------------------------------------  IN: 500 mL / OUT: 700 mL / NET: -200 mL              LABS: All Labs Reviewed:                        13.2   21.24 )-----------( 63       ( 17 Apr 2020 07:15 )             41.9                       17 Apr 2020 07:15    151    |  120    |  47     ----------------------------<  100    3.8     |  28     |  1.33     Ca    8.4        17 Apr 2020 07:15      Imaging:  < from: Xray Chest 1 View AP/PA. (04.16.20 @ 03:17) >    FINDINGS:  Lungs are grossly clear without focal or diffuse airspace opacity. Hemidiaphragms are sharp; no evidence of a pleural effusion. Right hemidiaphragm is again noted to be elevated. Cardiomediastinal silhouette is exaggerated by AP technique. Atherosclerotic calcifications of the thoracic aorta. Chronic left posterior rib fractures. Degenerative changes of the spine and glenohumeral joints.    IMPRESSION:    No acute findings.    Events Overnight:       Physical Exam:  Appearance: [ ] Normal  [ ] abnormal [ ] NAD   Eyes: [ ] PERRL [ ] EOMI  HEENT: [ ] Normal [ ] Abnormal oral mucosa [ ]NC/AT  Cardiovascular: [ ] S1 [ ] S2 [ ] RRR [ ] m/r/g [ ]edema [ ] JVP  Procedural Access Site: [ ]  hematoma [ ] tender to palpation [ ] 2+ pulse [ ] bruit [ ] Ecchymosis  Respiratory: [ ] Clear to auscultation bilaterally  Gastrointestinal: [ ] Soft [ ] tenderness[ ] distension [ ] BS  Musculoskeletal: [ ] clubbing [ ] joint deformity   Neurologic: [ ] Non-focal  Lymphatic: [ ] lymphadenopathy  Psychiatry: [ ] AAOx3  [ ] confused [ ] disoriented [ ] Mood & affect appropriate  Skin: [ ]  rashes [ ] ecchymoses [ ] cyanosis NP Progress Note       Follow Up:  SOB     HPI:  80 year old male with PMHx of vascular demenita, HTN, CHF, BPH, pt presents to ED from [facility] in respiratory distress room air sat 80 percent and agitation.  Per  [place] records known covid + on azithromycin and hydroxychloroquine per [place] outpt meds. In ED patient found to be in SVT, s/p cardizem 20mg IVP x 1 and heart improved. Patient given morphine 4mg IVP x 2 and Ativan 1mg IVP x 2 in ED.  GOC and advanced directives discussion taken place by ED Provider and ERICA Nuneze (Sister in law- lives in [place] [phone] ), DNR/DNI in place with focus of keeping patient comfortable. Hospice referral made by ED. (16 Apr 2020 07:52)        Subjective/Observations: Pt. seen and examined and evaluated. Pt. resting comfortably in bed in NAD, with no respiratory distress, no chest pain, dyspnea, palpitations, PND, or orthopnea. Pt. is on 4L nasal canula       REVIEW OF SYSTEMS: All other review of systems is negative unless indicated above    PAST MEDICAL & SURGICAL HISTORY:  Dementia  Dyslipidemia  HTN (hypertension)  No significant past surgical history      MEDICATIONS  (STANDING):  piperacillin/tazobactam IVPB.. 3.375 Gram(s) IV Intermittent every 8 hours  sodium chloride 0.9%. 1000 milliLiter(s) (75 mL/Hr) IV Continuous <Continuous>    MEDICATIONS  (PRN):  acetaminophen  Suppository .. 650 milliGRAM(s) Rectal every 6 hours PRN Temp greater or equal to 38C (100.4F), Mild Pain (1 - 3)  ALBUTerol    90 MICROgram(s) HFA Inhaler 2 Puff(s) Inhalation every 4 hours PRN Shortness of Breath and/or Wheezing  LORazepam   Injectable 0.5 milliGRAM(s) IV Push every 4 hours PRN anxiety/ agitation      Allergies:    No Known Allergies    Intolerances          Vital Signs Last 24 Hrs  T(C): 36.9 (17 Apr 2020 05:56), Max: 37.2 (16 Apr 2020 19:50)  T(F): 98.5 (17 Apr 2020 05:56), Max: 98.9 (16 Apr 2020 19:50)  HR: 86 (17 Apr 2020 05:56) (86 - 86)  BP: 134/74 (17 Apr 2020 05:56) (134/74 - 134/74)  BP(mean): --  RR: 15 (17 Apr 2020 05:56) (11 - 15)  SpO2: 97% (17 Apr 2020 05:56) (90% - 97%)    I&O's Summary    16 Apr 2020 07:01  -  17 Apr 2020 07:00  --------------------------------------------------------  IN: 500 mL / OUT: 700 mL / NET: -200 mL              LABS: All Labs Reviewed:                        13.2   21.24 )-----------( 63       ( 17 Apr 2020 07:15 )             41.9                       17 Apr 2020 07:15    151    |  120    |  47     ----------------------------<  100    3.8     |  28     |  1.33     Ca    8.4        17 Apr 2020 07:15      Imaging:  < from: Xray Chest 1 View AP/PA. (04.16.20 @ 03:17) >    FINDINGS:  Lungs are grossly clear without focal or diffuse airspace opacity. Hemidiaphragms are sharp; no evidence of a pleural effusion. Right hemidiaphragm is again noted to be elevated. Cardiomediastinal silhouette is exaggerated by AP technique. Atherosclerotic calcifications of the thoracic aorta. Chronic left posterior rib fractures. Degenerative changes of the spine and glenohumeral joints.    IMPRESSION:    No acute findings.    Events Overnight: No events noted overnight       Physical Exam:  Appearance: [ ] Normal  [ ] abnormal [X] NAD   Eyes: [ ] PERRL [ ] EOMI  HEENT: [ ] Normal [ ] Abnormal oral mucosa [ ]NC/AT  Cardiovascular: [X ] S1 [X ] S2 [ ] RRR [ ] m/r/g [ ]edema [ ] JVP  Procedural Access Site: [ ]  hematoma [ ] tender to palpation [ ] 2+ pulse [ ] bruit [ ] Ecchymosis  Respiratory: [X] Diminished BS  Gastrointestinal: [ ] Soft [ ] tenderness[ ] distension [ ] BS  Musculoskeletal: [ ] clubbing [ ] joint deformity   Neurologic: [ ] Non-focal  Lymphatic: [ ] lymphadenopathy  Psychiatry: [X ] AAOx3  [ ] confused [ ] disoriented [ ] Mood & affect appropriate  Skin: [ ]  rashes [ ] ecchymoses [ ] cyanosis NP Progress Note       Follow Up:  SOB     HPI:  80 year old male with PMHx of vascular demenita, HTN, CHF, BPH, pt presents to ED from [facility] in respiratory distress room air sat 80 percent and agitation.  Per  [place] records known covid + on azithromycin and hydroxychloroquine per [place] outpt meds. In ED patient found to be in SVT, s/p cardizem 20mg IVP x 1 and heart improved. Patient given morphine 4mg IVP x 2 and Ativan 1mg IVP x 2 in ED.  GOC and advanced directives discussion taken place by ED Provider and ERICA Nuneze (Sister in law- lives in [place] [phone] ), DNR/DNI in place with focus of keeping patient comfortable. Hospice referral made by ED. (16 Apr 2020 07:52)        Subjective/Observations: Pt. seen and examined and evaluated. Pt. resting comfortably in bed in NAD, with no respiratory distress, no chest pain, dyspnea, palpitations, PND, or orthopnea. Pt. is on 4L nasal canula       REVIEW OF SYSTEMS: All other review of systems is negative unless indicated above    PAST MEDICAL & SURGICAL HISTORY:  Dementia  Dyslipidemia  HTN (hypertension)  No significant past surgical history      MEDICATIONS  (STANDING):  piperacillin/tazobactam IVPB.. 3.375 Gram(s) IV Intermittent every 8 hours  sodium chloride 0.9%. 1000 milliLiter(s) (75 mL/Hr) IV Continuous <Continuous>    MEDICATIONS  (PRN):  acetaminophen  Suppository .. 650 milliGRAM(s) Rectal every 6 hours PRN Temp greater or equal to 38C (100.4F), Mild Pain (1 - 3)  ALBUTerol    90 MICROgram(s) HFA Inhaler 2 Puff(s) Inhalation every 4 hours PRN Shortness of Breath and/or Wheezing  LORazepam   Injectable 0.5 milliGRAM(s) IV Push every 4 hours PRN anxiety/ agitation      Allergies:    No Known Allergies    Intolerances          Vital Signs Last 24 Hrs  T(C): 36.9 (17 Apr 2020 05:56), Max: 37.2 (16 Apr 2020 19:50)  T(F): 98.5 (17 Apr 2020 05:56), Max: 98.9 (16 Apr 2020 19:50)  HR: 86 (17 Apr 2020 05:56) (86 - 86)  BP: 134/74 (17 Apr 2020 05:56) (134/74 - 134/74)  BP(mean): --  RR: 15 (17 Apr 2020 05:56) (11 - 15)  SpO2: 97% (17 Apr 2020 05:56) (90% - 97%)    I&O's Summary    16 Apr 2020 07:01  -  17 Apr 2020 07:00  --------------------------------------------------------  IN: 500 mL / OUT: 700 mL / NET: -200 mL              LABS: All Labs Reviewed:                        13.2   21.24 )-----------( 63       ( 17 Apr 2020 07:15 )             41.9                       17 Apr 2020 07:15    151    |  120    |  47     ----------------------------<  100    3.8     |  28     |  1.33     Ca    8.4        17 Apr 2020 07:15      Imaging:  < from: Xray Chest 1 View AP/PA. (04.16.20 @ 03:17) >    FINDINGS:  Lungs are grossly clear without focal or diffuse airspace opacity. Hemidiaphragms are sharp; no evidence of a pleural effusion. Right hemidiaphragm is again noted to be elevated. Cardiomediastinal silhouette is exaggerated by AP technique. Atherosclerotic calcifications of the thoracic aorta. Chronic left posterior rib fractures. Degenerative changes of the spine and glenohumeral joints.    IMPRESSION:    No acute findings.    Events Overnight: No events noted overnight       Physical Exam:  Appearance: [ ] Normal  [ ] abnormal [X] NAD   Eyes: [ ] PERRL [ ] EOMI  HEENT: [ ] Normal [ ] Abnormal oral mucosa [ ]NC/AT  Cardiovascular: [X ] S1 [X ] S2 [ ] RRR [ ] m/r/g [ ]edema [ ] JVP  Procedural Access Site: [ ]  hematoma [ ] tender to palpation [ ] 2+ pulse [ ] bruit [ ] Ecchymosis  Respiratory: [X] Diminished BS  Gastrointestinal: [ ] Soft [ ] tenderness[ ] distension [ ] BS  Musculoskeletal: [ ] clubbing [ ] joint deformity   Neurologic: [ ] Non-focal  Lymphatic: [ ] lymphadenopathy  Psychiatry: [X ] Alert  [ ] confused [ ] disoriented [ ] Mood & affect appropriate  Skin: [ ]  rashes [ ] ecchymoses [ ] cyanosis

## 2020-04-17 NOTE — PROGRESS NOTE ADULT - ATTENDING COMMENTS
Respiratory failure with hypoxia.  Plan: 2/2 COVID-19/  Gram negative bacteremia /sepsis  completed plaquenil  and zithromax at apex   continue zosyn , check UA and Ucx   Abd US r/o source of gram negative bacteremia , if unremarkable then may consider CT A/P  D Dimer 2000's, will do venous doppler  if pt becomes more hypoxic then would start  empirically  heparin drip to cover for PE  if platelet count allows  patient has very poor prognosis   and if his condition worsens despite conservative management then would be candidate for inpatient hospice   In ED patient was severe sedated  because he had received 8mg IV morphine and Iv ativan, today 4/17 patient is more awake, but prognosis remains poor  daughter would like to give trial of conversative management today with abx , if further deterioration then would be candidate for comfort care focus   - Goals of Care discussion had with ERICA Mcneill, by ED Provider: DNR/DNI- , MOLST in chart.         s/p SVT (supraventricular tachycardia).  in ED likely from sepsis , now sinus rhythm  S/P Cardizem 20mg IVP x1 in ED    VANCE/hypernatremia likely from hypovolemia   IVF D5W  and keep NPO pending  speech and swallow eval    #Dementia.    will hold oral meds   symptomatic medications via IV route can be given as needed for symptoms.    #dvt prophylaxis- High risk of DVT   would do lovenox despite low  PLt as long as PLT >50

## 2020-04-17 NOTE — SWALLOW BEDSIDE ASSESSMENT ADULT - SWALLOW EVAL: DIAGNOSIS
1) The pt demonstrates reduced orientation to feeding atop prominent Oropharyngeal Dysphagia with post prandial aspiration signs with the most conservative food textures(i.e puree, honey thick liquid). Effects of acute medical deconditioning(i.e COVID-19, sepsis with gram negative rods), Cerebral Palsy and underlying Dementia are felt to be contributory.  2) Writhing movements of head/extremities noted when encountered. He was arousable but fatigued and internally distractible. Eye opening and joint attention were fleeting. Unable to direct to structured communication tasks and he did not follow commands or attempt to verbalize despite cues. Prominent Cognitive Dysfunction from Dementia is evident which is likely heightened by encephalopathy features associated with acute illness(i.e COVID, sepsis).

## 2020-04-17 NOTE — SWALLOW BEDSIDE ASSESSMENT ADULT - ORAL PHASE
Once PO entered oral cavity, a delay in the initiation of intra oral processing actions was latent. When oral actions were initiated, they consisted of excessively prolonged disorganized discontinuous lingual pumping actions. Piecemeal deglutition was evident. Scattered tongue debris noted with puree food.

## 2020-04-17 NOTE — CONSULT NOTE ADULT - ASSESSMENT
80 year old male with PMHx of vascular demenita, HTN, CHF, BPH, admitted from [facility] on 4/16 for evaluation of agitation and shortness of breath; had outside test positive for COVID-19 infection; upon admission the patient had blood cultures were done and are growing gram negative rods; patient is unable to provide history and history per medical record.   1. Patient admitted with sepsis secondary to gram negative rods, may be Citrobacter, Providencia or other organism, not on PCR panel; also noted with leukocytosis most likely reactive to infection  - follow up cultures to identification  - source of organism may be urine versus prostate or GI origin  - will continue zosyn as ordered  - should send urine for u/a and culture  - may need imaging of kidney or abdomen/pelvis  - reviewed prior medical records to evaluate for resistant or atypical pathogens   - serial cbc and monitor temperature   - maintain isolation given reported COVID-19 infection  2.other issues: per medicine

## 2020-04-17 NOTE — SWALLOW BEDSIDE ASSESSMENT ADULT - SLP GENERAL OBSERVATIONS
Writhing movements of head/extremities noted when encountered. He was arousable but fatigued and internally distractible. Eye opening and joint attention were fleeting. Unable to direct to structured communication tasks and he did not follow commands or attempt to verbalize despite cues. Prominent Cognitive Dysfunction from Dementia is evident which is likely heightened by encephalopathy features associated with acute illness(i.e COVID, sepsis).

## 2020-04-17 NOTE — SWALLOW BEDSIDE ASSESSMENT ADULT - ORAL PREPARATORY PHASE
Pt was somewhat restless when PO was offered. His orientation to feeding was decreased. Resistive mouth closing with periodic tonic biting actions were noted when PO was offered.

## 2020-04-17 NOTE — SWALLOW BEDSIDE ASSESSMENT ADULT - NS SPL SWALLOW CLINIC TRIAL FT
Solids and liquids thinner than honey consistency were not offered given severity of pt's Dysphagia.

## 2020-04-17 NOTE — SWALLOW BEDSIDE ASSESSMENT ADULT - SWALLOW EVAL: RECOMMENDED DIET
SUGGEST NPO RESTRICTION AT THIS TIME. NOT CANDIDATE FOR PO IN CURRENT LETHARGIC/CONFUSED/DECONDITIONED STATE.

## 2020-04-17 NOTE — SWALLOW BEDSIDE ASSESSMENT ADULT - ASR SWALLOW LABIAL MOBILITY
Unable to formally assess though it is noted that writhing dyskinetic labial movements were often spontaneously demonstrated.

## 2020-04-17 NOTE — SWALLOW BEDSIDE ASSESSMENT ADULT - ASR SWALLOW LINGUAL MOBILITY
Unable to formally assess though it is noted that writhing dyskinetic disorganized lingual movements were often spontaneously demonstrated.

## 2020-04-17 NOTE — PROGRESS NOTE ADULT - ASSESSMENT
80 year old male with PMHx of vascular demenita, HTN, CHF, BPH, pt presents to ED from [facility] in respiratory distress room air sat 80 percent and agitation.  Per  [facility] records known covid + on azithromycin and hydroxychloroquine per [facility] outpt meds. In ED patient found to be in SVT, s/p cardizem 20mg IVP x 1 and heart improved. Patient given morphine 4mg IVP x 2 and Ativan 1mg IVP x 2 in ED.  GOC and advanced directives discussion taken place by ED Provider and ERICA Mcneill (Sister in law- lives in [place] [phone] ), DNR/DNI in place with focus of keeping patient comfortable. Hospice referral made by ED.       Problem/Plan - 1:  ·  Problem: Respiratory failure with hypoxia.  Plan: 2/2 COVID-19/ viral PNA  - Maintain on airborne isolation.  - Continue with O2 as needed via nasal cannula and up-titrate as needed. Currently on 5L saturating 95-97%  -Acetaminophen 650 mg NH q4h PRN fever. Limit use of NSAIDs.  - HFA albuterol Q6 hour PRN via MDI. Would avoid nebulized preparations to limit risk of aerosol formation.  -inflammatory markers pending   - Goals of Care discussion had with ERICA Mcneill, by ED Provider: DNR/DNI- hospice referral pending, KENDALL in chart.      Problem/Plan - 2:  ·  Problem: SVT (supraventricular tachycardia).  Plan: resolved  s/p Cardizem 20mg IVP x1 in ED  remained hypotensive   500cc IV bolus x 1  then NS @ 75mL x 12 hours.      Problem/Plan - 3:  ·  Problem: Dementia.  Plan: patient given medications for symptoms of agitation  will hold oral meds   symptomatic medications via IV route can be given as needed for symptoms. 80 year old male with PMHx of vascular demenita, HTN, CHF, BPH, pt presents to ED from [facility] in respiratory distress room air sat 80 percent and agitation.  Per  [facility] records known covid + on azithromycin and hydroxychloroquine per [facility] outpt meds. In ED patient found to be in SVT, s/p cardizem 20mg IVP x 1 and heart improved. Patient given morphine 4mg IVP x 2 and Ativan 1mg IVP x 2 in ED.  GOC and advanced directives discussion taken place by ED Provider and ERICA Mcneill (Sister in law- lives in [place] [phone] ), DNR/DNI in place with focus of keeping patient comfortable. Hospice referral made by ED.        Respiratory failure with hypoxia.  Plan: 2/2 COVID-19/ viral PNA       Problem/Plan - 1:  ·  Problem:   - Maintain on airborne isolation.  - Continue with O2 as needed via nasal cannula and up-titrate as needed. Currently on 5L saturating 95-97%  -Acetaminophen 650 mg DE q4h PRN fever. Limit use of NSAIDs.  - HFA albuterol Q6 hour PRN via MDI. Would avoid nebulized preparations to limit risk of aerosol formation.  -inflammatory markers pending   - Goals of Care discussion had with ERICA Mcneill, by ED Provider: DNR/DNI- hospice referral pending, KENDALL in chart.      Problem/Plan - 2:  ·  Problem: SVT (supraventricular tachycardia).  Plan: resolved  s/p Cardizem 20mg IVP x1 in ED  remained hypotensive   500cc IV bolus x 1  then NS @ 75mL x 12 hours.      Problem/Plan - 3:  ·  Problem: Dementia.  Plan: patient given medications for symptoms of agitation  will hold oral meds   symptomatic medications via IV route can be given as needed for symptoms. 80 year old male with PMHx of vascular demenita, HTN, CHF, BPH, pt presents to ED from [facility] in respiratory distress room air sat 80 percent and agitation.  Per  [facility] records known covid + on azithromycin and hydroxychloroquine per [facility] outpt meds. In ED patient found to be in SVT, s/p cardizem 20mg IVP x 1 and heart improved. Patient given morphine 4mg IVP x 2 and Ativan 1mg IVP x 2 in ED.  GOC and advanced directives discussion taken place by ED Provider and ERICA Mcneill (Sister in law- lives in [place] [phone] ), DNR/DNI in place with focus of keeping patient comfortable. Hospice referral made by ED.        Respiratory failure with hypoxia.  Plan: 2/2 COVID-19/ viral PNA  - Maintain on airborne isolation.  - Continue with O2 as needed via nasal cannula and up-titrate as needed. Currently on 5L saturating 95-97%  -Acetaminophen 650 mg CT q4h PRN fever. Limit use of NSAIDs.  - HFA albuterol Q6 hour PRN via MDI. Would avoid nebulized preparations to limit risk of aerosol formation -inflammatory markers pending   - Goals of Care discussion had with ERICA Mcneill, by ED Provider: DNR/DNI- hospice referral pending, KENDALL in chart.        SVT (supraventricular tachycardia).    S/P Cardizem 20mg IVP x1 in ED         Problem/Plan - 3:  ·  Problem: Dementia.  Plan: patient given medications for symptoms of agitation  will hold oral meds   symptomatic medications via IV route can be given as needed for symptoms. 80 year old male with PMHx of vascular demenita, HTN, CHF, BPH, pt presents to ED from [facility] in respiratory distress room air sat 80 percent and agitation.  Per  [facility] records known covid + on azithromycin and hydroxychloroquine per [facility] outpt meds. In ED patient found to be in SVT, s/p cardizem 20mg IVP x 1 and heart improved. Patient given morphine 4mg IVP x 2 and Ativan 1mg IVP x 2 in ED.  GOC and advanced directives discussion taken place by ED Provider and ERICA Mcneill (Sister in law- lives in [place] [phone] ), DNR/DNI in place with focus of keeping patient comfortable. Hospice referral made by ED.        Respiratory failure with hypoxia.  Plan: 2/2 COVID-19/  Gram negative bacteremia /sepsis  completed plaquenil  and zithromax at [place]   continue zosyn , check UA and Ucx   Abd US r/o source of gram negative bacteremia , if unremarkable then may consider CT A/P  D Dimer 2000's, will do venous doppler  if pt becomes more hypoxic then would start  empirically  heparin drip to cover for PE  if platelet count allows  patient has very poor prognosis   and if his condition worsens despite conservative management then would be candidate for inpatient hospice   In ED patient was severe sedated  because he had received 8mg IV morphine and Iv ativan, today 4/17 patient is more awake, but prognosis remains poor  daughter would like to give trial of conversative management today with abx , if further deterioration then would be candidate for comfort care focus   - Goals of Care discussion had with ERICA Mcneill, DNR/DNI- , KENDALL in chart.         s/p SVT (supraventricular tachycardia).  in ED likely from sepsis , now sinus rhythm  S/P Cardizem 20mg IVP x1 in ED    VANCE/hypernatremia likely from hypovolemia   IVF D5W  and keep NPO pending  speech and swallow eval    #Dementia.    will hold oral meds   symptomatic medications via IV route can be given as needed for symptoms.    #dvt prophylaxis- High risk of DVT   would do lovenox despite low  PLt as long as PLT >50 80 year old male with PMHx of vascular demenita, HTN, CHF, BPH, pt presents to ED from [facility] in respiratory distress room air sat 80 percent and agitation.  Per  [facility] records known covid + on azithromycin and hydroxychloroquine per [facility] out[facility] meds. In ED patient found to be in SVT, s/p cardizem 20mg IVP x 1 and heart improved. Patient given morphine 4mg IVP x 2 and Ativan 1mg IVP x 2 in ED.  GOC and advanced directives discussion taken place by ED Provider and ERICA Mcneill (Sister in law- lives in [place] [phone] ), DNR/DNI in place with focus of keeping patient comfortable. Hospice referral made by ED.        Respiratory failure with hypoxia.  Plan: 2/2 COVID-19/  Gram negative bacteremia /sepsis  completed plaquenil  and zithromax at [place]   continue zosyn , check UA and Ucx   Abd US r/o source of gram negative bacteremia , if unremarkable then may consider CT A/P  D Dimer 2000's, will do venous doppler  if pt becomes more hypoxic then would start  empirically  heparin drip to cover for PE  if platelet count allows  patient has very poor prognosis   and if his condition worsens despite conservative management then would be candidate for inpatient hospice   In ED patient was severe sedated  because he had received 8mg IV morphine and Iv ativan, today 4/17 patient is more awake, but prognosis remains poor  daughter would like to give trial of conversative management today with abx , if further deterioration then would be candidate for comfort care focus   - Goals of Care discussion had with ERICA Mcneill, DNR/DNI- , KENDALL in chart.         s/p SVT (supraventricular tachycardia).  in ED likely from sepsis , now sinus rhythm  S/P Cardizem 20mg IVP x1 in ED    VANCE/hypernatremia likely from hypovolemia   IVF D5W  and keep NPO pending  speech and swallow eval    #Dementia.    will hold oral meds   symptomatic medications via IV route can be given as needed for symptoms.    #dvt prophylaxis- High risk of DVT   would do lovenox despite low  PLt as long as PLT >50      discussed with Charito hayes HCP [phone] regarding poor prognosis , pt remains DNR/DNI and if conservative management fails , then pt is candidate for comforta care which HCP agreed for

## 2020-04-17 NOTE — SWALLOW BEDSIDE ASSESSMENT ADULT - ADDITIONAL RECOMMENDATIONS
1) NUTRITION FOLLOW UP. PT WITH DYSPHAGIA AND IS AT NUTRITION RISK. MAINTAINING NON ORALLY IS AT DISCRETION OF ATTENDINGS.     2) CONSIDER A PALLIATIVE CARE CONSULT.

## 2020-04-17 NOTE — SWALLOW BEDSIDE ASSESSMENT ADULT - COMMENTS
The pt was admitted to  from [facility] with altered mentation and SOB. Hospital course is notable for COVID-19, sepsis with gram negative rods in blood cultures, generalized deconditioning and altered mentation. This profile is superimposed upon a history of Dementia, depression, Cerebral Palsy, diastolic CHF, HTN, HLD, diastolic CHF, GERD, and BPH. The pt was admitted to  from [place] [facility] with altered mentation and SOB. Hospital course is notable for COVID-19, sepsis with gram negative rods in blood cultures, generalized deconditioning and altered mentation. This profile is superimposed upon a history of Dementia, depression, Cerebral Palsy, diastolic CHF, HTN, HLD, CHF, GERD, and BPH.

## 2020-04-17 NOTE — CONSULT NOTE ADULT - SUBJECTIVE AND OBJECTIVE BOX
Patient is a 80y old  Male who presents with a chief complaint of Shortness of breath (17 Apr 2020 11:33)    HPI:  80 year old male with PMHx of vascular demenita, HTN, CHF, BPH, admitted from snf on 4/16 for evaluation of agitation and shortness of breath; had outside test positive for COVID-19 infection; upon admission the patient had blood cultures were done and are growing gram negative rods; patient is unable to provide history and history per medical record.           PMH: as above  PSH: as above  Meds: per reconciliation sheet, noted below  MEDICATIONS  (STANDING):  dextrose 5%. 1000 milliLiter(s) (60 mL/Hr) IV Continuous <Continuous>  piperacillin/tazobactam IVPB.. 3.375 Gram(s) IV Intermittent every 8 hours  sodium chloride 0.9%. 1000 milliLiter(s) (75 mL/Hr) IV Continuous <Continuous>    MEDICATIONS  (PRN):  acetaminophen  Suppository .. 650 milliGRAM(s) Rectal every 6 hours PRN Temp greater or equal to 38C (100.4F), Mild Pain (1 - 3)  ALBUTerol    90 MICROgram(s) HFA Inhaler 2 Puff(s) Inhalation every 4 hours PRN Shortness of Breath and/or Wheezing  LORazepam   Injectable 0.5 milliGRAM(s) IV Push every 4 hours PRN anxiety/ agitation    Allergies    No Known Allergies    Intolerances      Social: no smoking, no alcohol, no illegal drugs; no recent travel, no exposure to TB  FAMILY HISTORY:  No pertinent family history in first degree relatives     ROS unable to obtain secondary to patient medical condition     Vital Signs Last 24 Hrs  T(C): 37.2 (17 Apr 2020 12:00), Max: 37.2 (16 Apr 2020 19:50)  T(F): 98.9 (17 Apr 2020 12:00), Max: 98.9 (16 Apr 2020 19:50)  HR: 106 (17 Apr 2020 12:00) (86 - 106)  BP: 140/93 (17 Apr 2020 12:00) (134/74 - 140/93)  BP(mean): --  RR: 15 (17 Apr 2020 12:00) (11 - 15)  SpO2: 94% (17 Apr 2020 12:00) (90% - 97%)  Daily     Daily     PE:    Constitutional: frail looking  HEENT: NC/AT, EOMI, PERRLA, conjunctivae clear; ears and nose atraumatic; pharynx clear  Neck: supple; thyroid not palpable  Back: no tenderness  Respiratory: respiratory effort normal; clear to auscultation  Cardiovascular: S1S2 regular, no murmurs  Abdomen: soft, not tender, not distended, positive BS; no liver or spleen organomegaly  Genitourinary: no suprapubic tenderness  Musculoskeletal: no muscle tenderness, no joint swelling or tenderness  Neurological/ Psychiatric:   moving all extremities  Skin: no rashes; no palpable lesions    Labs: all available labs reviewed                        13.2   21.24 )-----------( 63       ( 17 Apr 2020 07:15 )             41.9     04-17    151<H>  |  120<H>  |  47<H>  ----------------------------<  100<H>  3.8   |  28  |  1.33<H>    Ca    8.4<L>      17 Apr 2020 07:15    TPro  6.9  /  Alb  2.4<L>  /  TBili  0.8  /  DBili  x   /  AST  22  /  ALT  26  /  AlkPhos  173<H>  04-16     LIVER FUNCTIONS - ( 16 Apr 2020 02:18 )  Alb: 2.4 g/dL / Pro: 6.9 gm/dL / ALK PHOS: 173 U/L / ALT: 26 U/L / AST: 22 U/L / GGT: x             Culture - Blood (04.16.20 @ 02:18)    -  Multidrug (KPC pos) resistant organism: Nondet    -  Staphylococcus aureus: Nondet    -  Methicillin resistant Staphylococcus aureus (MRSA): Nondet    -  Coagulase negative Staphylococcus: Nondet    -  Enterococcus species: Nondet    -  Vancomycin resistant Enterococcus sp.: Nondet    -  Escherichia coli: Nondet    -  Klebsiella oxytoca: Nondet    -  Klebsiella pneumoniae: Nondet    -  Serratia marcescens: Nondet    -  Haemophilus influenzae: Nondet    -  Listeria monocytogenes: Nondet    -  Neisseria meningitidis: Nondet    -  Pseudomonas aeruginosa: Nondet    -  Acinetobacter baumanii: Nondet    -  Enterobacter cloacae complex: Nondet    -  Streptococcus sp. (Not Grp A, B or S pneumoniae): Nondet    -  Streptococcus agalactiae (Group B): Nondet    -  Streptococcus pyogenes (Group A): Nondet    -  Streptococcus pneumoniae: Nondet    -  Candida albicans: Nondet    -  Candida glabrata: Nondet    -  Candida krusei: Nondet    -  Candida parapsilosis: Nondet    -  Candida tropicalis: Nondet    Gram Stain:   Growth in aerobic bottle: Gram Negative Rods  Growth in anaerobic bottle: Gram Negative Rods    Specimen Source: .Blood Blood    Organism: Blood Culture PCR    Culture Results:   Growth in aerobic bottle: Gram Negative Rods  Growth in anaerobic bottle: Gram Negative Rods  ***Blood Panel PCR results on this specimen are available  approximately 3 hours after the Gram stain result.***  Gram stain, PCR, and/or culture results may not always  correspond due to difference in methodologies.  ************************************************************  This PCR assay was performed using [employer].  The following targets are tested for: Enterococcus,  vancomycin resistant enterococci, Listeria monocytogenes,  coagulase negative staphylococci, S. aureus,  methicillin resistant S. aureus, Streptococcus agalactiae  (Group B), S. pneumoniae, S. pyogenes (Group A),  Acinetobacter baumannii, Enterobacter cloacae, E. coli,  Klebsiella oxytoca, K. pneumoniae, Proteus sp.,  Serratia marcescens, Haemophilus influenzae,  Neisseria meningitidis, Pseudomonas aeruginosa, Candida  albicans, C. glabrata, C krusei, C parapsilosis,  C. tropicalis and the KPC resistance gene.  "Due to technical problems, Proteus sp. will Not be reported as part of  the BCID panel until further notice"    Organism Identification: Blood Culture PCR    Method Type: PCR        Radiology: all available radiological tests reviewed    Advanced directives addressed: full resuscitation

## 2020-04-17 NOTE — SWALLOW BEDSIDE ASSESSMENT ADULT - PHARYNGEAL PHASE
Swallow trigger was mildly latent. Laryngeal lift on palpation during swallowing trials was prominently reduced.  Latent weak coughing was demonstrated with honey thick liquids and less frequently pureed foods. The pt was not stimulable for use of therapy maneuvers.

## 2020-04-18 LAB
-  AMIKACIN: SIGNIFICANT CHANGE UP
-  AMPICILLIN/SULBACTAM: SIGNIFICANT CHANGE UP
-  AMPICILLIN: SIGNIFICANT CHANGE UP
-  AZTREONAM: SIGNIFICANT CHANGE UP
-  CEFAZOLIN: SIGNIFICANT CHANGE UP
-  CEFEPIME: SIGNIFICANT CHANGE UP
-  CEFOXITIN: SIGNIFICANT CHANGE UP
-  CEFTRIAXONE: SIGNIFICANT CHANGE UP
-  CIPROFLOXACIN: SIGNIFICANT CHANGE UP
-  ERTAPENEM: SIGNIFICANT CHANGE UP
-  GENTAMICIN: SIGNIFICANT CHANGE UP
-  LEVOFLOXACIN: SIGNIFICANT CHANGE UP
-  MEROPENEM: SIGNIFICANT CHANGE UP
-  PIPERACILLIN/TAZOBACTAM: SIGNIFICANT CHANGE UP
-  TOBRAMYCIN: SIGNIFICANT CHANGE UP
-  TRIMETHOPRIM/SULFAMETHOXAZOLE: SIGNIFICANT CHANGE UP
ALBUMIN SERPL ELPH-MCNC: 1.9 G/DL — LOW (ref 3.3–5)
ALP SERPL-CCNC: 92 U/L — SIGNIFICANT CHANGE UP (ref 40–120)
ALT FLD-CCNC: 867 U/L — HIGH (ref 12–78)
ANION GAP SERPL CALC-SCNC: 7 MMOL/L — SIGNIFICANT CHANGE UP (ref 5–17)
APTT BLD: 33.8 SEC — SIGNIFICANT CHANGE UP (ref 27.5–36.3)
APTT BLD: 72 SEC — HIGH (ref 27.5–36.3)
APTT BLD: 94.1 SEC — HIGH (ref 27.5–36.3)
AST SERPL-CCNC: 686 U/L — HIGH (ref 15–37)
BASOPHILS # BLD AUTO: 0 K/UL — SIGNIFICANT CHANGE UP (ref 0–0.2)
BASOPHILS NFR BLD AUTO: 0 % — SIGNIFICANT CHANGE UP (ref 0–2)
BILIRUB SERPL-MCNC: 1.2 MG/DL — SIGNIFICANT CHANGE UP (ref 0.2–1.2)
BUN SERPL-MCNC: 63 MG/DL — HIGH (ref 7–23)
CALCIUM SERPL-MCNC: 8.3 MG/DL — LOW (ref 8.5–10.1)
CHLORIDE SERPL-SCNC: 121 MMOL/L — HIGH (ref 96–108)
CO2 SERPL-SCNC: 25 MMOL/L — SIGNIFICANT CHANGE UP (ref 22–31)
CREAT SERPL-MCNC: 2.12 MG/DL — HIGH (ref 0.5–1.3)
CULTURE RESULTS: SIGNIFICANT CHANGE UP
EOSINOPHIL # BLD AUTO: 0 K/UL — SIGNIFICANT CHANGE UP (ref 0–0.5)
EOSINOPHIL NFR BLD AUTO: 0 % — SIGNIFICANT CHANGE UP (ref 0–6)
GLUCOSE SERPL-MCNC: 134 MG/DL — HIGH (ref 70–99)
HCT VFR BLD CALC: 42.5 % — SIGNIFICANT CHANGE UP (ref 39–50)
HCT VFR BLD CALC: 44.6 % — SIGNIFICANT CHANGE UP (ref 39–50)
HGB BLD-MCNC: 13.3 G/DL — SIGNIFICANT CHANGE UP (ref 13–17)
HGB BLD-MCNC: 14.2 G/DL — SIGNIFICANT CHANGE UP (ref 13–17)
LYMPHOCYTES # BLD AUTO: 0.77 K/UL — LOW (ref 1–3.3)
LYMPHOCYTES # BLD AUTO: 5 % — LOW (ref 13–44)
MCHC RBC-ENTMCNC: 30.6 PG — SIGNIFICANT CHANGE UP (ref 27–34)
MCHC RBC-ENTMCNC: 31.3 GM/DL — LOW (ref 32–36)
MCHC RBC-ENTMCNC: 31.3 PG — SIGNIFICANT CHANGE UP (ref 27–34)
MCHC RBC-ENTMCNC: 31.8 GM/DL — LOW (ref 32–36)
MCV RBC AUTO: 97.9 FL — SIGNIFICANT CHANGE UP (ref 80–100)
MCV RBC AUTO: 98.5 FL — SIGNIFICANT CHANGE UP (ref 80–100)
METHOD TYPE: SIGNIFICANT CHANGE UP
MONOCYTES # BLD AUTO: 1.7 K/UL — HIGH (ref 0–0.9)
MONOCYTES NFR BLD AUTO: 11 % — SIGNIFICANT CHANGE UP (ref 2–14)
NEUTROPHILS # BLD AUTO: 12.99 K/UL — HIGH (ref 1.8–7.4)
NEUTROPHILS NFR BLD AUTO: 81 % — HIGH (ref 43–77)
NRBC # BLD: SIGNIFICANT CHANGE UP /100 WBCS (ref 0–0)
ORGANISM # SPEC MICROSCOPIC CNT: SIGNIFICANT CHANGE UP
PLATELET # BLD AUTO: 85 K/UL — LOW (ref 150–400)
PLATELET # BLD AUTO: 86 K/UL — LOW (ref 150–400)
POTASSIUM SERPL-MCNC: 3.6 MMOL/L — SIGNIFICANT CHANGE UP (ref 3.5–5.3)
POTASSIUM SERPL-SCNC: 3.6 MMOL/L — SIGNIFICANT CHANGE UP (ref 3.5–5.3)
PROT SERPL-MCNC: 6 GM/DL — SIGNIFICANT CHANGE UP (ref 6–8.3)
RBC # BLD: 4.34 M/UL — SIGNIFICANT CHANGE UP (ref 4.2–5.8)
RBC # BLD: 4.53 M/UL — SIGNIFICANT CHANGE UP (ref 4.2–5.8)
RBC # FLD: 14.8 % — HIGH (ref 10.3–14.5)
RBC # FLD: 15 % — HIGH (ref 10.3–14.5)
SODIUM SERPL-SCNC: 153 MMOL/L — HIGH (ref 135–145)
SPECIMEN SOURCE: SIGNIFICANT CHANGE UP
WBC # BLD: 15.46 K/UL — HIGH (ref 3.8–10.5)
WBC # BLD: 16.65 K/UL — HIGH (ref 3.8–10.5)
WBC # FLD AUTO: 15.46 K/UL — HIGH (ref 3.8–10.5)
WBC # FLD AUTO: 16.65 K/UL — HIGH (ref 3.8–10.5)

## 2020-04-18 PROCEDURE — 99233 SBSQ HOSP IP/OBS HIGH 50: CPT | Mod: CS

## 2020-04-18 RX ORDER — CEFTRIAXONE 500 MG/1
2000 INJECTION, POWDER, FOR SOLUTION INTRAMUSCULAR; INTRAVENOUS EVERY 24 HOURS
Refills: 0 | Status: DISCONTINUED | OUTPATIENT
Start: 2020-04-18 | End: 2020-04-19

## 2020-04-18 RX ORDER — HEPARIN SODIUM 5000 [USP'U]/ML
5000 INJECTION INTRAVENOUS; SUBCUTANEOUS EVERY 6 HOURS
Refills: 0 | Status: DISCONTINUED | OUTPATIENT
Start: 2020-04-18 | End: 2020-04-19

## 2020-04-18 RX ORDER — CEFTRIAXONE 500 MG/1
2000 INJECTION, POWDER, FOR SOLUTION INTRAMUSCULAR; INTRAVENOUS EVERY 24 HOURS
Refills: 0 | Status: DISCONTINUED | OUTPATIENT
Start: 2020-04-18 | End: 2020-04-18

## 2020-04-18 RX ORDER — SODIUM CHLORIDE 9 MG/ML
1000 INJECTION, SOLUTION INTRAVENOUS
Refills: 0 | Status: DISCONTINUED | OUTPATIENT
Start: 2020-04-18 | End: 2020-04-19

## 2020-04-18 RX ORDER — HEPARIN SODIUM 5000 [USP'U]/ML
2500 INJECTION INTRAVENOUS; SUBCUTANEOUS EVERY 6 HOURS
Refills: 0 | Status: DISCONTINUED | OUTPATIENT
Start: 2020-04-18 | End: 2020-04-19

## 2020-04-18 RX ORDER — HEPARIN SODIUM 5000 [USP'U]/ML
INJECTION INTRAVENOUS; SUBCUTANEOUS
Qty: 25000 | Refills: 0 | Status: DISCONTINUED | OUTPATIENT
Start: 2020-04-18 | End: 2020-04-19

## 2020-04-18 RX ADMIN — CEFTRIAXONE 2000 MILLIGRAM(S): 500 INJECTION, POWDER, FOR SOLUTION INTRAMUSCULAR; INTRAVENOUS at 21:42

## 2020-04-18 RX ADMIN — PIPERACILLIN AND TAZOBACTAM 25 GRAM(S): 4; .5 INJECTION, POWDER, LYOPHILIZED, FOR SOLUTION INTRAVENOUS at 05:07

## 2020-04-18 RX ADMIN — SODIUM CHLORIDE 100 MILLILITER(S): 9 INJECTION, SOLUTION INTRAVENOUS at 20:00

## 2020-04-18 RX ADMIN — HEPARIN SODIUM 1100 UNIT(S)/HR: 5000 INJECTION INTRAVENOUS; SUBCUTANEOUS at 18:43

## 2020-04-18 RX ADMIN — HEPARIN SODIUM 1100 UNIT(S)/HR: 5000 INJECTION INTRAVENOUS; SUBCUTANEOUS at 11:59

## 2020-04-18 RX ADMIN — HEPARIN SODIUM 1100 UNIT(S)/HR: 5000 INJECTION INTRAVENOUS; SUBCUTANEOUS at 03:41

## 2020-04-18 RX ADMIN — PIPERACILLIN AND TAZOBACTAM 25 GRAM(S): 4; .5 INJECTION, POWDER, LYOPHILIZED, FOR SOLUTION INTRAVENOUS at 14:59

## 2020-04-18 RX ADMIN — Medication 650 MILLIGRAM(S): at 06:35

## 2020-04-18 NOTE — PROGRESS NOTE ADULT - ASSESSMENT
1.	COVID-19.  2.	gram negative sepsis.  UCx, no growth.  US not suggestive of cholecystitis.  f/u BCx speciation.  c/w Zosyn.  ID following.  3.	LLE   4.	SVT.  5.	dehydration/VANCE.  6.	dysphagia.  7.	demenita.    advanced directive.  -DNR/DNI.  -consider hospice if patient does not respond to conservative management. 1.	COVID-19.  known COVID at [place].  previously treated with hydroxychloroquine.  2.	gram negative sepsis.  UCx, no growth.  US not suggestive of cholecystitis.  f/u BCx speciation.  c/w Zosyn.  ID following.  3.	LLE peroneal DVT.  UFH gtt started by PA overnight.  caution regarding low platelet count.  continue to monitor.  4.	SVT.  resolved.  attributed to sepsis.  5.	dehydration/VANCE.  start rosa IVFs.  6.	dysphagia.  speech pathology input noted.  NPO.  7.	demenita.  8.	advanced directive  DNR/DNI.  consider hospice if patient does not respond to conservative management.  Palliative Medicine consult.

## 2020-04-18 NOTE — PROGRESS NOTE ADULT - SUBJECTIVE AND OBJECTIVE BOX
Date of service: 04-18-20 @ 16:09      Patient lying in bed; no complaints, confused      ROS unable to obtain secondary to patient medical condition     MEDICATIONS  (STANDING):  dextrose 5% + sodium chloride 0.45%. 1000 milliLiter(s) (100 mL/Hr) IV Continuous <Continuous>  heparin  Infusion.  Unit(s)/Hr (11 mL/Hr) IV Continuous <Continuous>  piperacillin/tazobactam IVPB.. 3.375 Gram(s) IV Intermittent every 8 hours    MEDICATIONS  (PRN):  acetaminophen  Suppository .. 650 milliGRAM(s) Rectal every 6 hours PRN Temp greater or equal to 38C (100.4F), Mild Pain (1 - 3)  ALBUTerol    90 MICROgram(s) HFA Inhaler 2 Puff(s) Inhalation every 4 hours PRN Shortness of Breath and/or Wheezing  heparin   Injectable 5000 Unit(s) IV Push every 6 hours PRN For aPTT less than 40  heparin   Injectable 2500 Unit(s) IV Push every 6 hours PRN For aPTT between 40 - 57  LORazepam   Injectable 0.5 milliGRAM(s) IV Push every 4 hours PRN anxiety/ agitation      Vital Signs Last 24 Hrs  T(C): 37.2 (18 Apr 2020 11:53), Max: 38.1 (18 Apr 2020 06:24)  T(F): 98.9 (18 Apr 2020 11:53), Max: 100.6 (18 Apr 2020 06:24)  HR: 99 (18 Apr 2020 11:53) (67 - 99)  BP: 149/91 (18 Apr 2020 11:53) (93/58 - 149/91)  BP(mean): --  RR: 19 (18 Apr 2020 11:53) (14 - 19)  SpO2: 100% (18 Apr 2020 11:53) (97% - 100%)        Physical Exam:          Constitutional: frail looking  HEENT: NC/AT, EOMI, PERRLA, conjunctivae clear; ears and nose atraumatic; pharynx clear  Neck: supple; thyroid not palpable  Back: no tenderness  Respiratory: respiratory effort normal; clear to auscultation  Cardiovascular: S1S2 regular, no murmurs  Abdomen: soft, not tender, not distended, positive BS; no liver or spleen organomegaly  Genitourinary: no suprapubic tenderness  Musculoskeletal: no muscle tenderness, no joint swelling or tenderness  Neurological/ Psychiatric:   moving all extremities  Skin: no rashes; no palpable lesions    Labs: all available labs reviewed                Labs:                        13.3   15.46 )-----------( 86       ( 18 Apr 2020 09:43 )             42.5     04-18    153<H>  |  121<H>  |  63<H>  ----------------------------<  134<H>  3.6   |  25  |  2.12<H>    Ca    8.3<L>      18 Apr 2020 09:43    TPro  6.0  /  Alb  1.9<L>  /  TBili  1.2  /  DBili  x   /  AST  686<H>  /  ALT  867<H>  /  AlkPhos  92  04-18           Cultures:       Culture - Blood (collected 04-16-20 @ 02:18)  Source: .Blood Blood  Gram Stain (04-16-20 @ 22:42):    Growth in aerobic bottle: Gram Negative Rods    Growth in anaerobic bottle: Gram Negative Rods  Final Report (04-18-20 @ 13:26):    Growth in aerobic and anaerobic bottles: Proteus mirabilis    ***Blood Panel PCR results on this specimen are available    approximately 3 hours after the Gram stain result.***    Gram stain, PCR, and/or culture results may not always    correspond due to difference in methodologies.    ************************************************************    This PCR assay was performed using [employer].    The following targets are tested for: Enterococcus,    vancomycin resistant enterococci, Listeria monocytogenes,    coagulase negative staphylococci, S. aureus,    methicillin resistant S. aureus, Streptococcus agalactiae    (Group B), S. pneumoniae, S. pyogenes (Group A),    Acinetobacter baumannii, Enterobacter cloacae, E. coli,    Klebsiella oxytoca, K. pneumoniae, Proteus sp.,    Serratia marcescens, Haemophilus influenzae,    Neisseria meningitidis, Pseudomonas aeruginosa, Candida    albicans, C. glabrata, C krusei, C parapsilosis,    C. tropicalis and the KPC resistance gene.    "Due to technical problems, Proteus sp. will Not be reported as part of    the BCID panel until further notice"  Organism: Blood Culture PCR  Proteus mirabilis (04-18-20 @ 13:26)  Organism: Proteus mirabilis (04-18-20 @ 13:26)      -  Amikacin: S <=16      -  Ampicillin: S <=8 These ampicillin results predict results for amoxicillin      -  Ampicillin/Sulbactam: S <=4/2 Enterobacter, Citrobacter, and Serratia may develop resistance during prolonged therapy (3-4 days)      -  Aztreonam: S <=4      -  Cefazolin: S <=2 Enterobacter, Citrobacter, and Serratia may develop resistance during prolonged therapy (3-4 days)      -  Cefepime: S <=2      -  Cefoxitin: S <=8      -  Ceftriaxone: S <=1 Enterobacter, Citrobacter, and Serratia may develop resistance during prolonged therapy      -  Ciprofloxacin: R >2      -  Ertapenem: S <=0.5      -  Gentamicin: S <=2      -  Levofloxacin: R >4      -  Meropenem: S <=1      -  Piperacillin/Tazobactam: S <=8      -  Tobramycin: S <=2      -  Trimethoprim/Sulfamethoxazole: S <=0.5/9.5      Method Type: ZECHARIAH  Organism: Blood Culture PCR (04-18-20 @ 13:26)      -  Acinetobacter baumanii: Nondet      -  Candida albicans: Nondet      -  Candida glabrata: Nondet      -  Candida krusei: Nondet      -  Candida parapsilosis: Nondet      -  Candida tropicalis: Nondet      -  Coagulase negative Staphylococcus: Nondet      -  Enterobacter cloacae complex: Nondet      -  Enterococcus species: Nondet      -  Escherichia coli: Nondet      -  Haemophilus influenzae: Nondet      -  Klebsiella oxytoca: Nondet      -  Klebsiella pneumoniae: Nondet      -  Listeria monocytogenes: Nondet      -  Methicillin resistant Staphylococcus aureus (MRSA): Nondet      -  Multidrug (KPC pos) resistant organism: Nondet      -  Neisseria meningitidis: Nondet      -  Pseudomonas aeruginosa: Nondet      -  Serratia marcescens: Nondet      -  Staphylococcus aureus: Nondet      -  Streptococcus agalactiae (Group B): Nondet      -  Streptococcus pneumoniae: Nondet      -  Streptococcus pyogenes (Group A): Nondet      -  Streptococcus sp. (Not Grp A, B or S pneumoniae): Nondet      -  Vancomycin resistant Enterococcus sp.: Nondet      Method Type: PCR      Ferritin, Serum: 392 ng/mL (04-16-20 @ 11:15)  C-Reactive Protein, Serum: 10.35 mg/dL (04-16-20 @ 11:15)  D-Dimer Assay, Quantitative: 2087 ng/mL DDU (04-16-20 @ 11:15)        Radiology: all available radiological tests reviewed    Advanced directives addressed: full resuscitation

## 2020-04-18 NOTE — PROGRESS NOTE ADULT - ASSESSMENT
80 year old male with PMHx of vascular demenita, HTN, CHF, BPH, admitted from sn[facility] on 4/16 for evaluation of agitation and shortness of breath; had outside test positive for COVID-19 infection; upon admission the patient had blood cultures were done and are growing gram negative rods; patient is unable to provide history and history per medical record.   1. Patient admitted with sepsis secondary to gram negative rods, may be Citrobacter, Providencia or other organism, not on PCR panel; also noted with leukocytosis most likely reactive to infection  - follow up cultures to identification--- found to be Proteus mirabilis, usually seen with nephrolithiais  - will optimize antibiotics to ceftriaxone 2 gram daily   - repeat blood cultures in am  - source of organism may be urine versus prostate or GI origin  - should send urine for u/a and culture  - reviewed prior medical records to evaluate for resistant or atypical pathogens   - serial cbc and monitor temperature   - maintain isolation given reported COVID-19 infection  2.other issues: per medicine

## 2020-04-18 NOTE — PROGRESS NOTE ADULT - SUBJECTIVE AND OBJECTIVE BOX
RN called to report on 04/18/2020 around 2AM that Doppler that was done earlier and  resulted  around 6:22 PM on 04/17/2020.   Right: Normal lower extremity venous Doppler of the femoral and popliteal veins of the right leg. Limited visualization of calf veins.  Left: Positive Calf vein thrombosis in the peroneal and soleal veins below the knee.     Pt was  on LMWH for DVT Ppx. As per Daytime MD note, Pt is at high risk for DVT's and consider starting  Heparin gtt for + DVT  or Hypoxia.    Pt's Platelet count is 63.   Will do stat CBC and aPTT.   Heparin gtt ordered. RN denies any active gross GI or   bleeding.   D/w RN.    Hospitalist to f/u in AM.

## 2020-04-18 NOTE — PROGRESS NOTE ADULT - SUBJECTIVE AND OBJECTIVE BOX
CC:  Patient is a 80y old  Male who presents with a chief complaint of Shortness of breath (18 Apr 2020 03:00)    SUBJECTIVE:     -SpO2 100% via NC @ 4 LPM.    ROS:  all other review of systems are negative unless indicated above.    acetaminophen  Suppository .. 650 milliGRAM(s) Rectal every 6 hours PRN  ALBUTerol    90 MICROgram(s) HFA Inhaler 2 Puff(s) Inhalation every 4 hours PRN  dextrose 5% + sodium chloride 0.45%. 1000 milliLiter(s) IV Continuous <Continuous>  heparin   Injectable 5000 Unit(s) IV Push every 6 hours PRN  heparin   Injectable 2500 Unit(s) IV Push every 6 hours PRN  heparin  Infusion.  Unit(s)/Hr IV Continuous <Continuous>  LORazepam   Injectable 0.5 milliGRAM(s) IV Push every 4 hours PRN  piperacillin/tazobactam IVPB.. 3.375 Gram(s) IV Intermittent every 8 hours    T(C): 37.2 (04-18-20 @ 11:53), Max: 38.1 (04-17-20 @ 16:00)  HR: 99 (04-18-20 @ 11:53) (67 - 99)  BP: 149/91 (04-18-20 @ 11:53) (93/58 - 149/91)  RR: 19 (04-18-20 @ 11:53) (14 - 19)  SpO2: 100% (04-18-20 @ 11:53) (97% - 100%)    Constitutional: NAD.   HEENT: PERRL, EOMI, MMM.  Neck: Soft and supple, No carotid bruit, No JVD  Respiratory: Breath sounds are clear bilaterally, No wheezing, rales or rhonchi  Cardiovascular: S1 and S2, regular rate and rhythm, no murmur, rub or gallop.  Gastrointestinal: Bowel Sounds present, soft, nontender, nondistended, no guarding, no rebound, no mass.  Extremities: No peripheral edema  Vascular: 2+ peripheral pulses  Neurological: A/O x , no focal deficits  Musculoskeletal: 5/5 strength b/l upper and lower extremities  Skin:  no visible rashes.                         13.3   15.46 )-----------( 86       ( 18 Apr 2020 09:43 )             42.5     PTT - ( 18 Apr 2020 09:43 )  PTT:72.0 sec  04-18    153<H>  |  121<H>  |  63<H>  ----------------------------<  134<H>  3.6   |  25  |  2.12<H>    Ca    8.3<L>      18 Apr 2020 09:43    TPro  6.0  /  Alb  1.9<L>  /  TBili  1.2  /  DBili  x   /  AST  686<H>  /  ALT  867<H>  /  AlkPhos  92  04-18    < from: US Abdomen Complete (04.17.20 @ 18:19) >  IMPRESSION:    Gallstones without biliary dilatation or secondary signs of acute cholecystitis     Culture - Urine (06.14.18 @ 13:55)    Specimen Source: .Urine Clean Catch (Midstream)    Culture Results:   No growth CC:  Patient is a 80y old  Male who presents with a chief complaint of Shortness of breath (18 Apr 2020 03:00)    SUBJECTIVE:     -SpO2 100% via NC @ 4 LPM.    ROS:  all other review of systems are negative unless indicated above.    acetaminophen  Suppository .. 650 milliGRAM(s) Rectal every 6 hours PRN  ALBUTerol    90 MICROgram(s) HFA Inhaler 2 Puff(s) Inhalation every 4 hours PRN  dextrose 5% + sodium chloride 0.45%. 1000 milliLiter(s) IV Continuous <Continuous>  heparin   Injectable 5000 Unit(s) IV Push every 6 hours PRN  heparin   Injectable 2500 Unit(s) IV Push every 6 hours PRN  heparin  Infusion.  Unit(s)/Hr IV Continuous <Continuous>  LORazepam   Injectable 0.5 milliGRAM(s) IV Push every 4 hours PRN  piperacillin/tazobactam IVPB.. 3.375 Gram(s) IV Intermittent every 8 hours    T(C): 37.2 (04-18-20 @ 11:53), Max: 38.1 (04-17-20 @ 16:00)  HR: 99 (04-18-20 @ 11:53) (67 - 99)  BP: 149/91 (04-18-20 @ 11:53) (93/58 - 149/91)  RR: 19 (04-18-20 @ 11:53) (14 - 19)  SpO2: 100% (04-18-20 @ 11:53) (97% - 100%)    Constitutional: NAD.   HEENT: PERRL, EOMI, MMM.  Neck: Soft and supple, No carotid bruit, No JVD  Respiratory: Breath sounds are clear bilaterally, No wheezing, rales or rhonchi  Cardiovascular: S1 and S2, regular rate and rhythm, no murmur, rub or gallop.  Gastrointestinal: Bowel Sounds present, soft, nontender, nondistended, no guarding, no rebound, no mass.  Extremities: No peripheral edema  Vascular: 2+ peripheral pulses  Neurological: A/O x , no focal deficits  Musculoskeletal: 5/5 strength b/l upper and lower extremities  Skin:  no visible rashes.                         13.3   15.46 )-----------( 86       ( 18 Apr 2020 09:43 )             42.5     PTT - ( 18 Apr 2020 09:43 )  PTT:72.0 sec  04-18    153<H>  |  121<H>  |  63<H>  ----------------------------<  134<H>  3.6   |  25  |  2.12<H>    Ca    8.3<L>      18 Apr 2020 09:43    TPro  6.0  /  Alb  1.9<L>  /  TBili  1.2  /  DBili  x   /  AST  686<H>  /  ALT  867<H>  /  AlkPhos  92  04-18    < from: US Abdomen Complete (04.17.20 @ 18:19) >  IMPRESSION:    Gallstones without biliary dilatation or secondary signs of acute cholecystitis     Culture - Urine (06.14.18 @ 13:55)    Specimen Source: .Urine Clean Catch (Midstream)    Culture Results:   No growth    < from: US Duplex Venous Lower Ext Complete, Bilateral (04.17.20 @ 18:15) >  IMPRESSION:     Right: Normal lower extremity venous Doppler of the femoral and popliteal veins of the right leg. Limited visualization of calf veins.    Left: Positive Calf vein thrombosis in the peroneal and soleal veins below the knee.     < end of copied text >

## 2020-04-19 VITALS — HEART RATE: 90 BPM | SYSTOLIC BLOOD PRESSURE: 141 MMHG | DIASTOLIC BLOOD PRESSURE: 90 MMHG | WEIGHT: 142.64 LBS

## 2020-04-19 LAB
ANION GAP SERPL CALC-SCNC: 7 MMOL/L — SIGNIFICANT CHANGE UP (ref 5–17)
APTT BLD: 80 SEC — HIGH (ref 27.5–36.3)
BUN SERPL-MCNC: 55 MG/DL — HIGH (ref 7–23)
CALCIUM SERPL-MCNC: 8.1 MG/DL — LOW (ref 8.5–10.1)
CHLORIDE SERPL-SCNC: 127 MMOL/L — HIGH (ref 96–108)
CO2 SERPL-SCNC: 23 MMOL/L — SIGNIFICANT CHANGE UP (ref 22–31)
CREAT SERPL-MCNC: 1.49 MG/DL — HIGH (ref 0.5–1.3)
GLUCOSE SERPL-MCNC: 124 MG/DL — HIGH (ref 70–99)
HCT VFR BLD CALC: 43.1 % — SIGNIFICANT CHANGE UP (ref 39–50)
HGB BLD-MCNC: 13.7 G/DL — SIGNIFICANT CHANGE UP (ref 13–17)
MCHC RBC-ENTMCNC: 31.1 PG — SIGNIFICANT CHANGE UP (ref 27–34)
MCHC RBC-ENTMCNC: 31.8 GM/DL — LOW (ref 32–36)
MCV RBC AUTO: 97.7 FL — SIGNIFICANT CHANGE UP (ref 80–100)
PLATELET # BLD AUTO: 110 K/UL — LOW (ref 150–400)
POTASSIUM SERPL-MCNC: 3 MMOL/L — LOW (ref 3.5–5.3)
POTASSIUM SERPL-SCNC: 3 MMOL/L — LOW (ref 3.5–5.3)
RBC # BLD: 4.41 M/UL — SIGNIFICANT CHANGE UP (ref 4.2–5.8)
RBC # FLD: 14.6 % — HIGH (ref 10.3–14.5)
SODIUM SERPL-SCNC: 157 MMOL/L — HIGH (ref 135–145)
WBC # BLD: 13.27 K/UL — HIGH (ref 3.8–10.5)
WBC # FLD AUTO: 13.27 K/UL — HIGH (ref 3.8–10.5)

## 2020-04-19 PROCEDURE — 99233 SBSQ HOSP IP/OBS HIGH 50: CPT | Mod: CS

## 2020-04-19 RX ORDER — ASPIRIN/CALCIUM CARB/MAGNESIUM 324 MG
1 TABLET ORAL
Qty: 0 | Refills: 0 | DISCHARGE

## 2020-04-19 RX ORDER — ASCORBIC ACID 60 MG
1 TABLET,CHEWABLE ORAL
Qty: 0 | Refills: 0 | DISCHARGE

## 2020-04-19 RX ORDER — MECLIZINE HCL 12.5 MG
1 TABLET ORAL
Qty: 0 | Refills: 0 | DISCHARGE

## 2020-04-19 RX ORDER — ACETAMINOPHEN 500 MG
2 TABLET ORAL
Qty: 0 | Refills: 0 | DISCHARGE

## 2020-04-19 RX ORDER — KETOCONAZOLE 20 MG/G
1 AEROSOL, FOAM TOPICAL
Qty: 0 | Refills: 0 | DISCHARGE

## 2020-04-19 RX ORDER — METOPROLOL TARTRATE 50 MG
1 TABLET ORAL
Qty: 0 | Refills: 0 | DISCHARGE

## 2020-04-19 RX ORDER — TAMSULOSIN HYDROCHLORIDE 0.4 MG/1
1 CAPSULE ORAL
Qty: 0 | Refills: 0 | DISCHARGE

## 2020-04-19 RX ORDER — ONDANSETRON 8 MG/1
1 TABLET, FILM COATED ORAL
Qty: 0 | Refills: 0 | DISCHARGE

## 2020-04-19 RX ORDER — HYDRALAZINE HCL 50 MG
10 TABLET ORAL ONCE
Refills: 0 | Status: COMPLETED | OUTPATIENT
Start: 2020-04-19 | End: 2020-04-19

## 2020-04-19 RX ORDER — FUROSEMIDE 40 MG
10 TABLET ORAL
Qty: 0 | Refills: 0 | DISCHARGE

## 2020-04-19 RX ORDER — FINASTERIDE 5 MG/1
1 TABLET, FILM COATED ORAL
Qty: 0 | Refills: 0 | DISCHARGE

## 2020-04-19 RX ADMIN — HEPARIN SODIUM 1100 UNIT(S)/HR: 5000 INJECTION INTRAVENOUS; SUBCUTANEOUS at 08:59

## 2020-04-19 RX ADMIN — Medication 10 MILLIGRAM(S): at 05:37

## 2020-04-19 RX ADMIN — SODIUM CHLORIDE 100 MILLILITER(S): 9 INJECTION, SOLUTION INTRAVENOUS at 04:24

## 2020-04-19 NOTE — DISCHARGE NOTE FOR THE EXPIRED PATIENT - HOSPITAL COURSE
CC:  Patient is a 80y old  Male who presents with a chief complaint of Shortness of breath (19 Apr 2020 09:21)    SUBJECTIVE:     -NC 5 LPM.  -obtunded.    ROS:  all other review of systems are negative unless indicated above.    acetaminophen  Suppository .. 650 milliGRAM(s) Rectal every 6 hours PRN  ALBUTerol    90 MICROgram(s) HFA Inhaler 2 Puff(s) Inhalation every 4 hours PRN  cefTRIAXone Injectable. 2000 milliGRAM(s) IV Push every 24 hours  dextrose 5% + sodium chloride 0.45%. 1000 milliLiter(s) IV Continuous <Continuous>  heparin   Injectable 5000 Unit(s) IV Push every 6 hours PRN  heparin   Injectable 2500 Unit(s) IV Push every 6 hours PRN  heparin  Infusion.  Unit(s)/Hr IV Continuous <Continuous>  LORazepam   Injectable 0.5 milliGRAM(s) IV Push every 4 hours PRN    T(C): 36.9 (04-19-20 @ 05:27), Max: 37.3 (04-18-20 @ 21:15)  HR: 90 (04-19-20 @ 06:12) (86 - 99)  BP: 141/90 (04-19-20 @ 06:12) (134/87 - 169/90)  RR: 19 (04-19-20 @ 05:27) (19 - 19)  SpO2: 100% (04-19-20 @ 05:27) (97% - 100%)    Constitutional: NAD.   HEENT: PERRL, EOMI, MMM.  Neck: Soft and supple, No carotid bruit, No JVD  Respiratory: Breath sounds are clear bilaterally, No wheezing, rales or rhonchi  Cardiovascular: S1 and S2, regular rate and rhythm, no murmur, rub or gallop.  Gastrointestinal: Bowel Sounds present, soft, nontender, nondistended, no guarding, no rebound, no mass.  Extremities: No peripheral edema  Vascular: 2+ peripheral pulses  Neurological: A/O x 0   Musculoskeletal: 5/5 strength b/l upper and lower extremities  Skin:  no visible rashes.                         13.7   13.27 )-----------( 110      ( 19 Apr 2020 07:49 )             43.1     PTT - ( 19 Apr 2020 07:49 )  PTT:80.0 sec  04-19    157<H>  |  127<H>  |  55<H>  ----------------------------<  124<H>  3.0<L>   |  23  |  1.49<H>    Ca    8.1<L>      19 Apr 2020 07:49    TPro  6.0  /  Alb  1.9<L>  /  TBili  1.2  /  DBili  x   /  AST  686<H>  /  ALT  867<H>  /  AlkPhos  92  04-18    1.	COVID-19.  known COVID at [facility].  previously treated with hydroxychloroquine.  2.	gram negative sepsis.  UCx, no growth.  US not suggestive of cholecystitis.  f/u BCx speciation.  c/w Zosyn.  ID following.  3.	LLE peroneal DVT.  UFH gtt started by PA overnight.  caution regarding low platelet count.  continue to monitor.  4.	SVT.  resolved.  attributed to sepsis.  5.	dehydration/VANCE.  start rosa IVFs.  6.	dysphagia.  speech pathology input noted.  NPO.  7.	demenita.  8.	advanced directive  DNR/DNI.  consider hospice if patient does not respond to conservative management.  Palliative Medicine consult.    patient seen w/ RN at bedside.  unarousable.  called by RN ~ 1 hour later.  patient passed away and pronounced at 1030H.

## 2020-04-19 NOTE — PROVIDER CONTACT NOTE (CHANGE IN STATUS NOTIFICATION) - SITUATION
pt found in bed at approx 1000 with no signs of respirations. Bp 53/38, temp 97.6. Dr Aceves arrived to room at 1000 and noted patients condition. Dr D'Amico made aware and assessed pt.  Dr D'Amico contacted next of Kin Mayra Sousa. post mortem care performed.

## 2020-04-19 NOTE — PROGRESS NOTE ADULT - SUBJECTIVE AND OBJECTIVE BOX
SPOKE TO PATIENTS EMERGENCY CONTACT AL SHANKAR. INFORMED HER OF THE STATUS OF THE PATIENT. DISCUSSED RECOMMENDATIONS TO STOP HEPARIN AND CONSIDER COMFORT CARE. ADVISED HER THAT MR QUILES WOULD BE MOVED AFTER PALLATIVE CARE CONSULT. AL SHANKAR AGREED WITH THE CARE PLAN.

## 2020-04-24 LAB
CULTURE RESULTS: SIGNIFICANT CHANGE UP
CULTURE RESULTS: SIGNIFICANT CHANGE UP
SPECIMEN SOURCE: SIGNIFICANT CHANGE UP
SPECIMEN SOURCE: SIGNIFICANT CHANGE UP

## 2023-10-08 NOTE — ED ADULT TRIAGE NOTE - CHIEF COMPLAINT QUOTE
Unwitnessed fall at [facility].  Occipital laceration, unknown LOC.  Cervical collar placed by EMS.  [facility] 15 on arrival, pt [place].  Daily asa.  Trauma alert called at 13:21
no